# Patient Record
Sex: FEMALE | Race: WHITE | NOT HISPANIC OR LATINO
[De-identification: names, ages, dates, MRNs, and addresses within clinical notes are randomized per-mention and may not be internally consistent; named-entity substitution may affect disease eponyms.]

---

## 2018-02-16 ENCOUNTER — RESULT REVIEW (OUTPATIENT)
Age: 63
End: 2018-02-16

## 2018-07-30 ENCOUNTER — RX RENEWAL (OUTPATIENT)
Age: 63
End: 2018-07-30

## 2018-08-07 ENCOUNTER — RECORD ABSTRACTING (OUTPATIENT)
Age: 63
End: 2018-08-07

## 2018-09-24 PROBLEM — Z80.0 FAMILY HISTORY OF MALIGNANT NEOPLASM OF COLON: Status: ACTIVE | Noted: 2018-08-07

## 2018-09-24 PROBLEM — Z87.891 FORMER SMOKER: Status: ACTIVE | Noted: 2018-08-07

## 2018-09-24 PROBLEM — Z83.3 FAMILY HISTORY OF TYPE 2 DIABETES MELLITUS: Status: ACTIVE | Noted: 2018-08-07

## 2018-09-24 PROBLEM — Z86.39 HISTORY OF HYPERTHYROIDISM: Status: RESOLVED | Noted: 2018-08-07 | Resolved: 2018-09-24

## 2018-09-24 PROBLEM — Z80.3 FAMILY HISTORY OF MALIGNANT NEOPLASM OF BREAST: Status: ACTIVE | Noted: 2018-08-07

## 2018-09-24 PROBLEM — R31.9 HEMATURIA: Status: ACTIVE | Noted: 2018-08-07

## 2018-09-24 PROBLEM — Z87.01 HISTORY OF PNEUMONIA: Status: RESOLVED | Noted: 2018-08-07 | Resolved: 2018-09-24

## 2018-09-24 PROBLEM — Z82.49 FAMILY HISTORY OF HYPERTENSION: Status: ACTIVE | Noted: 2018-08-07

## 2018-09-24 PROBLEM — L65.9 HAIR LOSS: Status: ACTIVE | Noted: 2018-08-07

## 2018-09-24 RX ORDER — LOSARTAN POTASSIUM 100 MG/1
100 TABLET, FILM COATED ORAL
Refills: 0 | Status: ACTIVE | COMMUNITY

## 2018-09-24 RX ORDER — DILTIAZEM HYDROCHLORIDE 180 MG/1
180 CAPSULE, EXTENDED RELEASE ORAL
Refills: 0 | Status: ACTIVE | COMMUNITY

## 2018-09-25 ENCOUNTER — RX RENEWAL (OUTPATIENT)
Age: 63
End: 2018-09-25

## 2018-09-28 ENCOUNTER — APPOINTMENT (OUTPATIENT)
Dept: INTERNAL MEDICINE | Facility: CLINIC | Age: 63
End: 2018-09-28
Payer: COMMERCIAL

## 2018-09-28 DIAGNOSIS — R31.9 HEMATURIA, UNSPECIFIED: ICD-10-CM

## 2018-09-28 DIAGNOSIS — Z87.891 PERSONAL HISTORY OF NICOTINE DEPENDENCE: ICD-10-CM

## 2018-09-28 DIAGNOSIS — Z87.01 PERSONAL HISTORY OF PNEUMONIA (RECURRENT): ICD-10-CM

## 2018-09-28 DIAGNOSIS — Z82.49 FAMILY HISTORY OF ISCHEMIC HEART DISEASE AND OTHER DISEASES OF THE CIRCULATORY SYSTEM: ICD-10-CM

## 2018-09-28 DIAGNOSIS — Z80.0 FAMILY HISTORY OF MALIGNANT NEOPLASM OF DIGESTIVE ORGANS: ICD-10-CM

## 2018-09-28 DIAGNOSIS — L65.9 NONSCARRING HAIR LOSS, UNSPECIFIED: ICD-10-CM

## 2018-09-28 DIAGNOSIS — Z83.3 FAMILY HISTORY OF DIABETES MELLITUS: ICD-10-CM

## 2018-09-28 DIAGNOSIS — Z86.39 PERSONAL HISTORY OF OTHER ENDOCRINE, NUTRITIONAL AND METABOLIC DISEASE: ICD-10-CM

## 2018-09-28 DIAGNOSIS — Z80.3 FAMILY HISTORY OF MALIGNANT NEOPLASM OF BREAST: ICD-10-CM

## 2018-10-16 ENCOUNTER — APPOINTMENT (OUTPATIENT)
Dept: INTERNAL MEDICINE | Facility: CLINIC | Age: 63
End: 2018-10-16
Payer: COMMERCIAL

## 2018-10-16 ENCOUNTER — LABORATORY RESULT (OUTPATIENT)
Age: 63
End: 2018-10-16

## 2018-10-16 VITALS
DIASTOLIC BLOOD PRESSURE: 52 MMHG | SYSTOLIC BLOOD PRESSURE: 120 MMHG | TEMPERATURE: 97 F | BODY MASS INDEX: 57.73 KG/M2 | WEIGHT: 275 LBS | HEIGHT: 58 IN

## 2018-10-16 DIAGNOSIS — Z11.59 ENCOUNTER FOR SCREENING FOR OTHER VIRAL DISEASES: ICD-10-CM

## 2018-10-16 PROCEDURE — 36415 COLL VENOUS BLD VENIPUNCTURE: CPT

## 2018-10-16 PROCEDURE — 99214 OFFICE O/P EST MOD 30 MIN: CPT | Mod: 25

## 2018-10-16 NOTE — PLAN
[FreeTextEntry1] : She is depressed. She would benefit from seeing a therapist but she lacks the motivation to scheduled appointment. At this point will start her on Zoloft 50 mg. Risks and benefits discussed with patient. She'll followup with me in one month\par Blood pressure is stable\par She was treated for hepatitis C in the past.\par Check labs today\par Declined flu shot

## 2018-10-16 NOTE — HISTORY OF PRESENT ILLNESS
[FreeTextEntry1] : f/u BP. depression [de-identified] : She feels very depressed since her long-time partner, Myron Trevizo, . She has problems sleeping. She lacks motivation. Her cousin tries to get her out of the house. She has gained weight.She tries to talk to her son but her son and Myron a very strange and therefore he doesn't want to talk about it. She has looked up the therapists under her insurance plan but has not been motivated enough to call. She is not suicidal or homicidal. Her cardiologist gave her Xanax but she didn't like the way it made her feel.

## 2018-10-16 NOTE — HEALTH RISK ASSESSMENT
[1] : 1) Little interest or pleasure doing things for several days (1) [3] : 2) Feeling down, depressed, or hopeless for nearly every day (3) [] : No [de-identified] : former [[id number]] : 4

## 2018-10-16 NOTE — PHYSICAL EXAM
[No Acute Distress] : no acute distress [Well Nourished] : well nourished [Normal Sclera/Conjunctiva] : normal sclera/conjunctiva [Normal Outer Ear/Nose] : the outer ears and nose were normal in appearance [No Respiratory Distress] : no respiratory distress  [Clear to Auscultation] : lungs were clear to auscultation bilaterally [No Accessory Muscle Use] : no accessory muscle use [Normal Rate] : normal rate  [Regular Rhythm] : with a regular rhythm [Normal S1, S2] : normal S1 and S2 [Normal Gait] : normal gait [No Focal Deficits] : no focal deficits [Alert and Oriented x3] : oriented to person, place, and time [de-identified] : overweight [de-identified] : tearful

## 2018-10-16 NOTE — REVIEW OF SYSTEMS
[Suicidal] : suicidal [Insomnia] : insomnia [Depression] : depression [Negative] : Respiratory [Anxiety] : no anxiety

## 2018-10-18 LAB
ALBUMIN SERPL ELPH-MCNC: 4.4 G/DL
ALP BLD-CCNC: 65 U/L
ALT SERPL-CCNC: 22 U/L
ANION GAP SERPL CALC-SCNC: 17 MMOL/L
AST SERPL-CCNC: 24 U/L
BILIRUB SERPL-MCNC: 0.4 MG/DL
BUN SERPL-MCNC: 18 MG/DL
CALCIUM SERPL-MCNC: 9.8 MG/DL
CHLORIDE SERPL-SCNC: 102 MMOL/L
CHOLEST SERPL-MCNC: 167 MG/DL
CHOLEST/HDLC SERPL: 3.3 RATIO
CO2 SERPL-SCNC: 23 MMOL/L
CREAT SERPL-MCNC: 0.77 MG/DL
GLUCOSE SERPL-MCNC: 112 MG/DL
HBA1C MFR BLD HPLC: 6.2 %
HCV AB SER QL: REACTIVE
HCV S/CO RATIO: 8.41 S/CO
HDLC SERPL-MCNC: 50 MG/DL
LDLC SERPL CALC-MCNC: 95 MG/DL
POTASSIUM SERPL-SCNC: 4.2 MMOL/L
PROT SERPL-MCNC: 7.4 G/DL
SODIUM SERPL-SCNC: 142 MMOL/L
TRIGL SERPL-MCNC: 111 MG/DL

## 2018-10-30 ENCOUNTER — RX RENEWAL (OUTPATIENT)
Age: 63
End: 2018-10-30

## 2018-11-12 ENCOUNTER — APPOINTMENT (OUTPATIENT)
Dept: INTERNAL MEDICINE | Facility: CLINIC | Age: 63
End: 2018-11-12
Payer: COMMERCIAL

## 2018-11-27 ENCOUNTER — APPOINTMENT (OUTPATIENT)
Dept: INTERNAL MEDICINE | Facility: CLINIC | Age: 63
End: 2018-11-27
Payer: COMMERCIAL

## 2018-11-27 VITALS
TEMPERATURE: 99.1 F | WEIGHT: 274 LBS | BODY MASS INDEX: 57.27 KG/M2 | SYSTOLIC BLOOD PRESSURE: 136 MMHG | DIASTOLIC BLOOD PRESSURE: 70 MMHG

## 2018-11-27 DIAGNOSIS — F32.9 MAJOR DEPRESSIVE DISORDER, SINGLE EPISODE, UNSPECIFIED: ICD-10-CM

## 2018-11-27 DIAGNOSIS — M79.89 OTHER SPECIFIED SOFT TISSUE DISORDERS: ICD-10-CM

## 2018-11-27 PROCEDURE — 99214 OFFICE O/P EST MOD 30 MIN: CPT

## 2018-11-27 NOTE — HISTORY OF PRESENT ILLNESS
[FreeTextEntry8] : 6 days ago she developed chills and frequent bowel movements. Those symptoms have resolved. About 3 days ago she noticed that her left leg is red and swollen. She thinks it looks a little better today than it has in the days prior.\par \par Last visit she was prescribed Zoloft but she never took it. She is starting to feel a little better.

## 2018-11-27 NOTE — PHYSICAL EXAM
[No Acute Distress] : no acute distress [Well Nourished] : well nourished [No Respiratory Distress] : no respiratory distress  [Clear to Auscultation] : lungs were clear to auscultation bilaterally [Normal Rate] : normal rate  [Regular Rhythm] : with a regular rhythm [Normal S1, S2] : normal S1 and S2 [No Extremity Clubbing/Cyanosis] : no extremity clubbing/cyanosis [Normal Gait] : normal gait [No Focal Deficits] : no focal deficits [Normal Affect] : the affect was normal [Normal Insight/Judgement] : insight and judgment were intact [de-identified] : LE edema [de-identified] : LLE swelling [de-identified] : LLE mild erythema

## 2018-11-27 NOTE — PLAN
[FreeTextEntry1] : Will treat for cellulitis with Keflex. Given the swelling will also get a venous Doppler to rule out a DVT.\par \par She is still seeing her therapist. As she feels a little better no need for Zoloft at this time. But will reconsider if her symptoms worsen.

## 2018-11-27 NOTE — REVIEW OF SYSTEMS
[Fever] : fever [Chills] : chills [Abdominal Pain] : no abdominal pain [Nausea] : nausea [Diarrhea] : diarrhea [Vomiting] : no vomiting [Itching] : no itching [Skin Rash] : no skin rash [Suicidal] : not suicidal [Depression] : depression [Negative] : Respiratory [de-identified] : as noted in HPI

## 2018-12-04 ENCOUNTER — RX RENEWAL (OUTPATIENT)
Age: 63
End: 2018-12-04

## 2019-01-02 ENCOUNTER — APPOINTMENT (OUTPATIENT)
Dept: INTERNAL MEDICINE | Facility: CLINIC | Age: 64
End: 2019-01-02

## 2019-01-28 ENCOUNTER — RX RENEWAL (OUTPATIENT)
Age: 64
End: 2019-01-28

## 2019-02-13 ENCOUNTER — APPOINTMENT (OUTPATIENT)
Dept: INTERNAL MEDICINE | Facility: CLINIC | Age: 64
End: 2019-02-13
Payer: COMMERCIAL

## 2019-02-13 VITALS
HEIGHT: 58 IN | DIASTOLIC BLOOD PRESSURE: 80 MMHG | BODY MASS INDEX: 56.68 KG/M2 | SYSTOLIC BLOOD PRESSURE: 140 MMHG | TEMPERATURE: 98.6 F | WEIGHT: 270 LBS

## 2019-02-13 VITALS — DIASTOLIC BLOOD PRESSURE: 72 MMHG | SYSTOLIC BLOOD PRESSURE: 130 MMHG

## 2019-02-13 PROCEDURE — 99214 OFFICE O/P EST MOD 30 MIN: CPT | Mod: 25

## 2019-02-13 PROCEDURE — 36415 COLL VENOUS BLD VENIPUNCTURE: CPT

## 2019-02-13 RX ORDER — SERTRALINE HYDROCHLORIDE 50 MG/1
50 TABLET, FILM COATED ORAL
Qty: 30 | Refills: 1 | Status: DISCONTINUED | COMMUNITY
Start: 2018-10-16 | End: 2019-02-13

## 2019-02-13 RX ORDER — CEPHALEXIN 500 MG/1
500 TABLET ORAL EVERY 6 HOURS
Qty: 28 | Refills: 0 | Status: DISCONTINUED | COMMUNITY
Start: 2018-11-27 | End: 2019-02-13

## 2019-02-13 NOTE — PLAN
[FreeTextEntry1] : Depression screening negative\par Her blood pressure is okay. She does need to lose weight. She is not motivated at this time.\par Check labs today\par Followup 3 months

## 2019-02-13 NOTE — HISTORY OF PRESENT ILLNESS
[FreeTextEntry1] : f/u BP, weight. depression [de-identified] : She never took the Zoloft. She is feeling better day by day.\par She saw her cardiologist recently. Her blood pressure was okay. He wants her to do bariatric surgery.\par She denies chest pain, palpitations, shortness of breath. She does have edema. She takes Lasix but did not take it today as she was leaving home.

## 2019-02-26 LAB
ALBUMIN SERPL ELPH-MCNC: 4.6 G/DL
ALP BLD-CCNC: 70 U/L
ALT SERPL-CCNC: 24 U/L
ANION GAP SERPL CALC-SCNC: 12 MMOL/L
AST SERPL-CCNC: 20 U/L
BILIRUB SERPL-MCNC: 0.4 MG/DL
BUN SERPL-MCNC: 16 MG/DL
CALCIUM SERPL-MCNC: 9.9 MG/DL
CHLORIDE SERPL-SCNC: 102 MMOL/L
CO2 SERPL-SCNC: 26 MMOL/L
CREAT SERPL-MCNC: 0.98 MG/DL
GLUCOSE SERPL-MCNC: 115 MG/DL
HBA1C MFR BLD HPLC: 6.2 %
POTASSIUM SERPL-SCNC: 4.5 MMOL/L
PROT SERPL-MCNC: 7.6 G/DL
SODIUM SERPL-SCNC: 140 MMOL/L

## 2019-03-28 ENCOUNTER — APPOINTMENT (OUTPATIENT)
Dept: INTERNAL MEDICINE | Facility: CLINIC | Age: 64
End: 2019-03-28
Payer: COMMERCIAL

## 2019-03-28 VITALS
DIASTOLIC BLOOD PRESSURE: 60 MMHG | SYSTOLIC BLOOD PRESSURE: 100 MMHG | BODY MASS INDEX: 56.68 KG/M2 | TEMPERATURE: 98.7 F | WEIGHT: 270 LBS | HEIGHT: 58 IN

## 2019-03-28 DIAGNOSIS — G56.22 LESION OF ULNAR NERVE, LEFT UPPER LIMB: ICD-10-CM

## 2019-03-28 PROCEDURE — 99214 OFFICE O/P EST MOD 30 MIN: CPT

## 2019-03-28 NOTE — HEALTH RISK ASSESSMENT
[0] : 2) Feeling down, depressed, or hopeless: Not at all (0) [] : No [de-identified] : former [[id number]] : 0

## 2019-03-28 NOTE — PHYSICAL EXAM
[No Acute Distress] : no acute distress [Well Nourished] : well nourished [Normal Sclera/Conjunctiva] : normal sclera/conjunctiva [Normal Outer Ear/Nose] : the outer ears and nose were normal in appearance [No Respiratory Distress] : no respiratory distress  [Clear to Auscultation] : lungs were clear to auscultation bilaterally [No Accessory Muscle Use] : no accessory muscle use [Normal Rate] : normal rate  [Regular Rhythm] : with a regular rhythm [Normal S1, S2] : normal S1 and S2 [No Joint Swelling] : no joint swelling [Grossly Normal Strength/Tone] : grossly normal strength/tone [Normal Gait] : normal gait [Normal Affect] : the affect was normal [Normal Insight/Judgement] : insight and judgment were intact [de-identified] : overweight [de-identified] : no tenderness at  [de-identified] : sensation to light touch slightly impaired in left lateral arm

## 2019-03-28 NOTE — PLAN
[FreeTextEntry1] : Depression screening negative\par Likely ulnar neuropathy. However Recommended an EKG but the patient refused. She has seen her cardiologist recently and her symptoms do not feel cardiac to her\par referred to neurology for evaluation. Advised her if she has any worsening of symptoms she should also see her cardiologist. She expressed understanding

## 2019-03-28 NOTE — HISTORY OF PRESENT ILLNESS
[FreeTextEntry8] : She complains of numbness and tingling going from her left elbow into the fourth and fifth digits. She has had the symptoms for several weeks. The symptoms are constant. No relief with anti-inflammatories. No weakness. She had carpal tunnel syndrome in the past bilaterally and had surgery for it.\par She denies chest pain, palpitations, shortness of breath. She saw her cardiologist a few weeks ago.

## 2019-04-29 ENCOUNTER — RX RENEWAL (OUTPATIENT)
Age: 64
End: 2019-04-29

## 2019-05-07 ENCOUNTER — RX RENEWAL (OUTPATIENT)
Age: 64
End: 2019-05-07

## 2019-05-07 ENCOUNTER — RX CHANGE (OUTPATIENT)
Age: 64
End: 2019-05-07

## 2019-05-22 ENCOUNTER — APPOINTMENT (OUTPATIENT)
Dept: INTERNAL MEDICINE | Facility: CLINIC | Age: 64
End: 2019-05-22
Payer: COMMERCIAL

## 2019-05-22 VITALS
HEIGHT: 58 IN | SYSTOLIC BLOOD PRESSURE: 122 MMHG | BODY MASS INDEX: 57.93 KG/M2 | DIASTOLIC BLOOD PRESSURE: 80 MMHG | TEMPERATURE: 97.3 F | WEIGHT: 276 LBS

## 2019-05-22 PROCEDURE — 36415 COLL VENOUS BLD VENIPUNCTURE: CPT

## 2019-05-22 PROCEDURE — 99214 OFFICE O/P EST MOD 30 MIN: CPT | Mod: 25

## 2019-05-22 NOTE — REVIEW OF SYSTEMS
[Lower Ext Edema] : lower extremity edema [Negative] : Neurological [Chest Pain] : no chest pain [Palpitations] : no palpitations

## 2019-05-22 NOTE — HISTORY OF PRESENT ILLNESS
[FreeTextEntry1] : f/u BP, blood sugar, weight [de-identified] : She is compliant with her medications\par She is not motivated to lose weight despite knowing she needs to\par She has edema and takes lasix when she is not leaving the house\par no chest pain, palpitations, SOB

## 2019-05-22 NOTE — PLAN
[FreeTextEntry1] : BP is stable on diltiazem and losartan\par She needs to lose weight. her cardiologist recommended bariatric surgery but she is not interested. referred to a nutritionist. needs exercise also\par Check labs\par given FIT testing supplies (but she says she had a colonoscopy in 2015)\par f/u 6 months

## 2019-05-22 NOTE — PHYSICAL EXAM
[No Acute Distress] : no acute distress [Well Nourished] : well nourished [Normal Sclera/Conjunctiva] : normal sclera/conjunctiva [Normal Outer Ear/Nose] : the outer ears and nose were normal in appearance [No Respiratory Distress] : no respiratory distress  [Clear to Auscultation] : lungs were clear to auscultation bilaterally [No Accessory Muscle Use] : no accessory muscle use [Normal Rate] : normal rate  [Regular Rhythm] : with a regular rhythm [Normal S1, S2] : normal S1 and S2 [Normal Gait] : normal gait [No Focal Deficits] : no focal deficits [Normal Affect] : the affect was normal [Normal Insight/Judgement] : insight and judgment were intact [de-identified] : LE edema

## 2019-05-29 LAB
ALBUMIN SERPL ELPH-MCNC: 4.3 G/DL
ALP BLD-CCNC: 66 U/L
ALT SERPL-CCNC: 26 U/L
ANION GAP SERPL CALC-SCNC: 14 MMOL/L
AST SERPL-CCNC: 19 U/L
BILIRUB SERPL-MCNC: 0.4 MG/DL
BUN SERPL-MCNC: 18 MG/DL
CALCIUM SERPL-MCNC: 9.2 MG/DL
CHLORIDE SERPL-SCNC: 104 MMOL/L
CHOLEST SERPL-MCNC: 153 MG/DL
CHOLEST/HDLC SERPL: 3.1 RATIO
CO2 SERPL-SCNC: 25 MMOL/L
CREAT SERPL-MCNC: 0.79 MG/DL
ESTIMATED AVERAGE GLUCOSE: 131 MG/DL
GLUCOSE SERPL-MCNC: 120 MG/DL
HBA1C MFR BLD HPLC: 6.2 %
HDLC SERPL-MCNC: 49 MG/DL
LDLC SERPL CALC-MCNC: 70 MG/DL
POTASSIUM SERPL-SCNC: 4.6 MMOL/L
PROT SERPL-MCNC: 6.8 G/DL
SODIUM SERPL-SCNC: 143 MMOL/L
TRIGL SERPL-MCNC: 170 MG/DL

## 2019-07-10 ENCOUNTER — MOBILE ON CALL (OUTPATIENT)
Age: 64
End: 2019-07-10

## 2019-08-01 ENCOUNTER — RX RENEWAL (OUTPATIENT)
Age: 64
End: 2019-08-01

## 2019-10-28 ENCOUNTER — RX RENEWAL (OUTPATIENT)
Age: 64
End: 2019-10-28

## 2019-12-05 ENCOUNTER — APPOINTMENT (OUTPATIENT)
Dept: INTERNAL MEDICINE | Facility: CLINIC | Age: 64
End: 2019-12-05
Payer: MEDICARE

## 2019-12-05 VITALS
DIASTOLIC BLOOD PRESSURE: 80 MMHG | HEIGHT: 58 IN | SYSTOLIC BLOOD PRESSURE: 160 MMHG | WEIGHT: 276 LBS | TEMPERATURE: 98.2 F | BODY MASS INDEX: 57.93 KG/M2

## 2019-12-05 DIAGNOSIS — Z12.39 ENCOUNTER FOR OTHER SCREENING FOR MALIGNANT NEOPLASM OF BREAST: ICD-10-CM

## 2019-12-05 DIAGNOSIS — B37.2 CANDIDIASIS OF SKIN AND NAIL: ICD-10-CM

## 2019-12-05 PROCEDURE — 36415 COLL VENOUS BLD VENIPUNCTURE: CPT

## 2019-12-05 PROCEDURE — 99214 OFFICE O/P EST MOD 30 MIN: CPT | Mod: 25

## 2019-12-05 NOTE — PHYSICAL EXAM
[No Acute Distress] : no acute distress [Well Nourished] : well nourished [Normal Sclera/Conjunctiva] : normal sclera/conjunctiva [Normal Outer Ear/Nose] : the outer ears and nose were normal in appearance [No Respiratory Distress] : no respiratory distress  [No Accessory Muscle Use] : no accessory muscle use [Clear to Auscultation] : lungs were clear to auscultation bilaterally [Normal Rate] : normal rate  [Regular Rhythm] : with a regular rhythm [Normal S1, S2] : normal S1 and S2 [Coordination Grossly Intact] : coordination grossly intact [Normal Affect] : the affect was normal [Normal Gait] : normal gait [Normal Insight/Judgement] : insight and judgment were intact [de-identified] : oerweight [de-identified] : edema b/l

## 2019-12-05 NOTE — HISTORY OF PRESENT ILLNESS
[FreeTextEntry1] : f/u BP, blood sugar, weight [de-identified] : 63-year-old female with history of morbid obesity, hypertension and borderline diabetes is here for followup. She saw her cardiologist last month. She will have a stress test. No chest pain or  SOB. She is trying to "move more". She doesn’t drink soda.\par She c/o rash under her breasts. it is red and itchy. requesting a cream.\par

## 2019-12-05 NOTE — PLAN
[FreeTextEntry1] : BP is stable on Diltiazem and losartan. Continue current doses\par Continue Lasix\par She needs to actively work on her weight. She should consider Weight Watchers\par Clotrimazole cream for rash under breasts\par Check labs today, including CMP lipids, send results to her cardiologist\par Followup 6 months

## 2019-12-11 ENCOUNTER — RESULT REVIEW (OUTPATIENT)
Age: 64
End: 2019-12-11

## 2019-12-11 LAB
ALBUMIN SERPL ELPH-MCNC: 4.4 G/DL
ALP BLD-CCNC: 66 U/L
ALT SERPL-CCNC: 16 U/L
ANION GAP SERPL CALC-SCNC: 12 MMOL/L
AST SERPL-CCNC: 9 U/L
BILIRUB SERPL-MCNC: 0.4 MG/DL
BUN SERPL-MCNC: 17 MG/DL
CALCIUM SERPL-MCNC: 9.3 MG/DL
CHLORIDE SERPL-SCNC: 101 MMOL/L
CHOLEST SERPL-MCNC: 173 MG/DL
CHOLEST/HDLC SERPL: 3.5 RATIO
CO2 SERPL-SCNC: 25 MMOL/L
CREAT SERPL-MCNC: 0.75 MG/DL
ESTIMATED AVERAGE GLUCOSE: 134 MG/DL
GLUCOSE SERPL-MCNC: 137 MG/DL
HBA1C MFR BLD HPLC: 6.3 %
HDLC SERPL-MCNC: 50 MG/DL
LDLC SERPL CALC-MCNC: 85 MG/DL
POTASSIUM SERPL-SCNC: 4.7 MMOL/L
PROT SERPL-MCNC: 7 G/DL
SODIUM SERPL-SCNC: 138 MMOL/L
TRIGL SERPL-MCNC: 192 MG/DL

## 2020-01-21 ENCOUNTER — RX RENEWAL (OUTPATIENT)
Age: 65
End: 2020-01-21

## 2020-07-31 ENCOUNTER — APPOINTMENT (OUTPATIENT)
Dept: INTERNAL MEDICINE | Facility: CLINIC | Age: 65
End: 2020-07-31
Payer: MEDICARE

## 2020-07-31 VITALS
DIASTOLIC BLOOD PRESSURE: 70 MMHG | HEART RATE: 88 BPM | BODY MASS INDEX: 57.51 KG/M2 | SYSTOLIC BLOOD PRESSURE: 120 MMHG | OXYGEN SATURATION: 98 % | WEIGHT: 274 LBS | HEIGHT: 58 IN | TEMPERATURE: 98.1 F

## 2020-07-31 DIAGNOSIS — Z12.11 ENCOUNTER FOR SCREENING FOR MALIGNANT NEOPLASM OF COLON: ICD-10-CM

## 2020-07-31 PROCEDURE — 36415 COLL VENOUS BLD VENIPUNCTURE: CPT

## 2020-07-31 PROCEDURE — 99214 OFFICE O/P EST MOD 30 MIN: CPT | Mod: 25

## 2020-07-31 NOTE — ASSESSMENT
[FreeTextEntry1] : Depression screening negative \par Pressure is controlled on diltiazem, losartan and Lasix\par Asthma is controlled on Serevent and Asmanex.  She has albuterol as needed but does not usually have to take it\par Check CMP, lipids and hemoglobin A1c today\par She should schedule bariatric surgery evaluation\par Follow-up 6 months\par

## 2020-07-31 NOTE — PHYSICAL EXAM
[No Acute Distress] : no acute distress [Normal Sclera/Conjunctiva] : normal sclera/conjunctiva [Well Nourished] : well nourished [Normal Outer Ear/Nose] : the outer ears and nose were normal in appearance [No Accessory Muscle Use] : no accessory muscle use [No Respiratory Distress] : no respiratory distress  [Clear to Auscultation] : lungs were clear to auscultation bilaterally [Normal Rate] : normal rate  [Normal S1, S2] : normal S1 and S2 [Regular Rhythm] : with a regular rhythm [No Edema] : there was no peripheral edema [No CVA Tenderness] : no CVA  tenderness [No Spinal Tenderness] : no spinal tenderness [Coordination Grossly Intact] : coordination grossly intact [Normal Gait] : normal gait [Normal Affect] : the affect was normal [Normal Insight/Judgement] : insight and judgment were intact [de-identified] : overweight

## 2020-07-31 NOTE — HISTORY OF PRESENT ILLNESS
[FreeTextEntry1] : f/u BP, blood sugar [de-identified] : 65 y/o female with h/o morbid obesity, asthma, HTN and borderline DM who is here for f/u.\par Woke up last week with pain across her low back. still has pain in her left hip. took advil but didn’t help. now taking aleve\par Stress test 1/20\par mammo 1/20\par optho 3/20\par Saw cards in June. \par never scheduled for the bariatric surgery evaluation. eating less sweets. eating more apples and bananas. No exercise\par

## 2020-07-31 NOTE — HEALTH RISK ASSESSMENT
[MammogramDate] : 01/20 [0] : 2) Feeling down, depressed, or hopeless: Not at all (0) [[id number]] : 0 [de-identified] : former

## 2020-08-03 LAB
ALBUMIN SERPL ELPH-MCNC: 4.4 G/DL
ALP BLD-CCNC: 66 U/L
ALT SERPL-CCNC: 21 U/L
ANION GAP SERPL CALC-SCNC: 17 MMOL/L
AST SERPL-CCNC: 22 U/L
BILIRUB SERPL-MCNC: 0.4 MG/DL
BUN SERPL-MCNC: 19 MG/DL
CALCIUM SERPL-MCNC: 9.3 MG/DL
CHLORIDE SERPL-SCNC: 104 MMOL/L
CHOLEST SERPL-MCNC: 160 MG/DL
CHOLEST/HDLC SERPL: 3.4 RATIO
CO2 SERPL-SCNC: 25 MMOL/L
CREAT SERPL-MCNC: 0.86 MG/DL
ESTIMATED AVERAGE GLUCOSE: 134 MG/DL
GLUCOSE SERPL-MCNC: 132 MG/DL
HBA1C MFR BLD HPLC: 6.3 %
HDLC SERPL-MCNC: 47 MG/DL
LDLC SERPL CALC-MCNC: 86 MG/DL
POTASSIUM SERPL-SCNC: 4.7 MMOL/L
PROT SERPL-MCNC: 6.8 G/DL
SODIUM SERPL-SCNC: 145 MMOL/L
TRIGL SERPL-MCNC: 134 MG/DL

## 2021-01-19 ENCOUNTER — RX RENEWAL (OUTPATIENT)
Age: 66
End: 2021-01-19

## 2021-03-31 ENCOUNTER — APPOINTMENT (OUTPATIENT)
Dept: INTERNAL MEDICINE | Facility: CLINIC | Age: 66
End: 2021-03-31
Payer: MEDICARE

## 2021-03-31 VITALS
OXYGEN SATURATION: 96 % | HEIGHT: 58 IN | SYSTOLIC BLOOD PRESSURE: 110 MMHG | TEMPERATURE: 98.1 F | BODY MASS INDEX: 56.25 KG/M2 | WEIGHT: 268 LBS | DIASTOLIC BLOOD PRESSURE: 70 MMHG | RESPIRATION RATE: 16 BRPM | HEART RATE: 100 BPM

## 2021-03-31 DIAGNOSIS — J30.9 ALLERGIC RHINITIS, UNSPECIFIED: ICD-10-CM

## 2021-03-31 PROCEDURE — 99214 OFFICE O/P EST MOD 30 MIN: CPT | Mod: 25

## 2021-03-31 PROCEDURE — 36415 COLL VENOUS BLD VENIPUNCTURE: CPT

## 2021-03-31 RX ORDER — FLUTICASONE PROPIONATE 50 UG/1
50 SPRAY, METERED NASAL
Refills: 0 | Status: DISCONTINUED | COMMUNITY
End: 2021-03-31

## 2021-03-31 RX ORDER — FUROSEMIDE 40 MG/1
40 TABLET ORAL
Refills: 0 | Status: ACTIVE | COMMUNITY

## 2021-03-31 RX ORDER — MOMETASONE FUROATE 220 UG/1
220 INHALANT RESPIRATORY (INHALATION)
Qty: 3 | Refills: 0 | Status: DISCONTINUED | COMMUNITY
Start: 2018-07-30 | End: 2021-03-31

## 2021-03-31 RX ORDER — MELOXICAM 15 MG/1
15 TABLET ORAL
Qty: 30 | Refills: 0 | Status: COMPLETED | COMMUNITY
Start: 2021-03-10

## 2021-03-31 RX ORDER — DILTIAZEM HYDROCHLORIDE 180 MG/1
180 CAPSULE, EXTENDED RELEASE ORAL
Qty: 90 | Refills: 0 | Status: COMPLETED | COMMUNITY
Start: 2021-02-12

## 2021-03-31 NOTE — HISTORY OF PRESENT ILLNESS
[FreeTextEntry1] : Follow-up blood pressure and blood sugar [de-identified] : 65-year-old female with a history of morbid obesity, asthma, hypertension and borderline diabetes.\par She went to ortho in October with sciatica on the right. She had an MRI which had multilevel disease and spinal stenosis. She was given meloxicam and tramadol. She went to PT. \par She uses flonase for allergies\par Asmanex no longer covered. Need to change arnuity, flovent, Pulmicort or Qvar\par Saw cardiology in Jan\par Saw optho last week\par He did not schedule the bariatric surgery evaluation

## 2021-03-31 NOTE — PHYSICAL EXAM
[No Acute Distress] : no acute distress [Well Nourished] : well nourished [Normal Sclera/Conjunctiva] : normal sclera/conjunctiva [Normal Outer Ear/Nose] : the outer ears and nose were normal in appearance [No Respiratory Distress] : no respiratory distress  [No Accessory Muscle Use] : no accessory muscle use [Clear to Auscultation] : lungs were clear to auscultation bilaterally [Normal Rate] : normal rate  [Regular Rhythm] : with a regular rhythm [Normal S1, S2] : normal S1 and S2 [No Edema] : there was no peripheral edema [Coordination Grossly Intact] : coordination grossly intact [Normal Gait] : normal gait [Normal Affect] : the affect was normal [Normal Insight/Judgement] : insight and judgment were intact [de-identified] : overweight

## 2021-03-31 NOTE — PLAN
[FreeTextEntry1] : Depression screening negative \par Blood Pressure is controlled on diltiazem and  losartan. takes lasix prn onlyLasix\par Asthma is controlled on Serevent and Asmanex.  Will change asmanex to flovent. She has albuterol as needed but does not usually have to take it\par Check CMP, lipids and hemoglobin A1c today\par She should schedule bariatric surgery evaluation\par Follow-up 6 months

## 2021-04-06 ENCOUNTER — NON-APPOINTMENT (OUTPATIENT)
Age: 66
End: 2021-04-06

## 2021-04-06 LAB
ALBUMIN SERPL ELPH-MCNC: 4.4 G/DL
ALP BLD-CCNC: 73 U/L
ALT SERPL-CCNC: 19 U/L
ANION GAP SERPL CALC-SCNC: 13 MMOL/L
AST SERPL-CCNC: 16 U/L
BASOPHILS # BLD AUTO: 0.07 K/UL
BASOPHILS NFR BLD AUTO: 0.8 %
BILIRUB SERPL-MCNC: 0.5 MG/DL
BUN SERPL-MCNC: 17 MG/DL
CALCIUM SERPL-MCNC: 9.6 MG/DL
CHLORIDE SERPL-SCNC: 101 MMOL/L
CHOLEST SERPL-MCNC: 180 MG/DL
CO2 SERPL-SCNC: 25 MMOL/L
CREAT SERPL-MCNC: 0.76 MG/DL
EOSINOPHIL # BLD AUTO: 0.12 K/UL
EOSINOPHIL NFR BLD AUTO: 1.3 %
ESTIMATED AVERAGE GLUCOSE: 131 MG/DL
GLUCOSE SERPL-MCNC: 120 MG/DL
HBA1C MFR BLD HPLC: 6.2 %
HCT VFR BLD CALC: 45 %
HDLC SERPL-MCNC: 53 MG/DL
HGB BLD-MCNC: 14 G/DL
IMM GRANULOCYTES NFR BLD AUTO: 0.3 %
LDLC SERPL CALC-MCNC: 86 MG/DL
LYMPHOCYTES # BLD AUTO: 2.84 K/UL
LYMPHOCYTES NFR BLD AUTO: 31.9 %
MAN DIFF?: NORMAL
MCHC RBC-ENTMCNC: 29.5 PG
MCHC RBC-ENTMCNC: 31.1 GM/DL
MCV RBC AUTO: 94.9 FL
MONOCYTES # BLD AUTO: 0.52 K/UL
MONOCYTES NFR BLD AUTO: 5.8 %
NEUTROPHILS # BLD AUTO: 5.31 K/UL
NEUTROPHILS NFR BLD AUTO: 59.9 %
NONHDLC SERPL-MCNC: 127 MG/DL
PLATELET # BLD AUTO: 315 K/UL
POTASSIUM SERPL-SCNC: 4.3 MMOL/L
PROT SERPL-MCNC: 7 G/DL
RBC # BLD: 4.74 M/UL
RBC # FLD: 13.1 %
SODIUM SERPL-SCNC: 139 MMOL/L
TRIGL SERPL-MCNC: 206 MG/DL
WBC # FLD AUTO: 8.89 K/UL

## 2021-04-09 ENCOUNTER — NON-APPOINTMENT (OUTPATIENT)
Age: 66
End: 2021-04-09

## 2021-07-17 ENCOUNTER — TRANSCRIPTION ENCOUNTER (OUTPATIENT)
Age: 66
End: 2021-07-17

## 2021-07-17 ENCOUNTER — INPATIENT (INPATIENT)
Facility: HOSPITAL | Age: 66
LOS: 2 days | Discharge: HOME | DRG: 872 | End: 2021-07-20
Attending: INTERNAL MEDICINE | Admitting: INTERNAL MEDICINE
Payer: MEDICARE

## 2021-07-17 VITALS
SYSTOLIC BLOOD PRESSURE: 148 MMHG | OXYGEN SATURATION: 95 % | RESPIRATION RATE: 20 BRPM | TEMPERATURE: 99 F | WEIGHT: 270.07 LBS | DIASTOLIC BLOOD PRESSURE: 76 MMHG | HEART RATE: 119 BPM | HEIGHT: 58 IN

## 2021-07-17 DIAGNOSIS — Z96.651 PRESENCE OF RIGHT ARTIFICIAL KNEE JOINT: Chronic | ICD-10-CM

## 2021-07-17 LAB
ALBUMIN SERPL ELPH-MCNC: 3.5 G/DL — SIGNIFICANT CHANGE UP (ref 3.3–5)
ALP SERPL-CCNC: 65 U/L — SIGNIFICANT CHANGE UP (ref 40–120)
ALT FLD-CCNC: 26 U/L — SIGNIFICANT CHANGE UP (ref 12–78)
ANION GAP SERPL CALC-SCNC: 9 MMOL/L — SIGNIFICANT CHANGE UP (ref 5–17)
APPEARANCE UR: ABNORMAL
APTT BLD: 32.1 SEC — SIGNIFICANT CHANGE UP (ref 27.5–35.5)
AST SERPL-CCNC: 16 U/L — SIGNIFICANT CHANGE UP (ref 15–37)
BASOPHILS # BLD AUTO: 0.07 K/UL — SIGNIFICANT CHANGE UP (ref 0–0.2)
BASOPHILS NFR BLD AUTO: 0.4 % — SIGNIFICANT CHANGE UP (ref 0–2)
BILIRUB SERPL-MCNC: 0.9 MG/DL — SIGNIFICANT CHANGE UP (ref 0.2–1.2)
BILIRUB UR-MCNC: NEGATIVE — SIGNIFICANT CHANGE UP
BUN SERPL-MCNC: 14 MG/DL — SIGNIFICANT CHANGE UP (ref 7–23)
CALCIUM SERPL-MCNC: 9.1 MG/DL — SIGNIFICANT CHANGE UP (ref 8.5–10.1)
CHLORIDE SERPL-SCNC: 101 MMOL/L — SIGNIFICANT CHANGE UP (ref 96–108)
CO2 SERPL-SCNC: 28 MMOL/L — SIGNIFICANT CHANGE UP (ref 22–31)
COLOR SPEC: YELLOW — SIGNIFICANT CHANGE UP
CREAT SERPL-MCNC: 1 MG/DL — SIGNIFICANT CHANGE UP (ref 0.5–1.3)
DIFF PNL FLD: ABNORMAL
EOSINOPHIL # BLD AUTO: 0 K/UL — SIGNIFICANT CHANGE UP (ref 0–0.5)
EOSINOPHIL NFR BLD AUTO: 0 % — SIGNIFICANT CHANGE UP (ref 0–6)
GLUCOSE SERPL-MCNC: 138 MG/DL — HIGH (ref 70–99)
GLUCOSE UR QL: NEGATIVE — SIGNIFICANT CHANGE UP
HCT VFR BLD CALC: 43.5 % — SIGNIFICANT CHANGE UP (ref 34.5–45)
HGB BLD-MCNC: 13.9 G/DL — SIGNIFICANT CHANGE UP (ref 11.5–15.5)
IMM GRANULOCYTES NFR BLD AUTO: 0.4 % — SIGNIFICANT CHANGE UP (ref 0–1.5)
INR BLD: 1.23 RATIO — HIGH (ref 0.88–1.16)
KETONES UR-MCNC: NEGATIVE — SIGNIFICANT CHANGE UP
LACTATE SERPL-SCNC: 3.5 MMOL/L — HIGH (ref 0.7–2)
LEUKOCYTE ESTERASE UR-ACNC: ABNORMAL
LIDOCAIN IGE QN: 81 U/L — SIGNIFICANT CHANGE UP (ref 73–393)
LYMPHOCYTES # BLD AUTO: 1.5 K/UL — SIGNIFICANT CHANGE UP (ref 1–3.3)
LYMPHOCYTES # BLD AUTO: 9.4 % — LOW (ref 13–44)
MCHC RBC-ENTMCNC: 28.7 PG — SIGNIFICANT CHANGE UP (ref 27–34)
MCHC RBC-ENTMCNC: 32 GM/DL — SIGNIFICANT CHANGE UP (ref 32–36)
MCV RBC AUTO: 89.9 FL — SIGNIFICANT CHANGE UP (ref 80–100)
MONOCYTES # BLD AUTO: 0.63 K/UL — SIGNIFICANT CHANGE UP (ref 0–0.9)
MONOCYTES NFR BLD AUTO: 4 % — SIGNIFICANT CHANGE UP (ref 2–14)
NEUTROPHILS # BLD AUTO: 13.63 K/UL — HIGH (ref 1.8–7.4)
NEUTROPHILS NFR BLD AUTO: 85.8 % — HIGH (ref 43–77)
NITRITE UR-MCNC: NEGATIVE — SIGNIFICANT CHANGE UP
NRBC # BLD: 0 /100 WBCS — SIGNIFICANT CHANGE UP (ref 0–0)
PH UR: 6 — SIGNIFICANT CHANGE UP (ref 5–8)
PLATELET # BLD AUTO: 280 K/UL — SIGNIFICANT CHANGE UP (ref 150–400)
POTASSIUM SERPL-MCNC: 3.5 MMOL/L — SIGNIFICANT CHANGE UP (ref 3.5–5.3)
POTASSIUM SERPL-SCNC: 3.5 MMOL/L — SIGNIFICANT CHANGE UP (ref 3.5–5.3)
PROT SERPL-MCNC: 7.9 G/DL — SIGNIFICANT CHANGE UP (ref 6–8.3)
PROT UR-MCNC: NEGATIVE — SIGNIFICANT CHANGE UP
PROTHROM AB SERPL-ACNC: 14.2 SEC — HIGH (ref 10.6–13.6)
RBC # BLD: 4.84 M/UL — SIGNIFICANT CHANGE UP (ref 3.8–5.2)
RBC # FLD: 13.4 % — SIGNIFICANT CHANGE UP (ref 10.3–14.5)
SODIUM SERPL-SCNC: 138 MMOL/L — SIGNIFICANT CHANGE UP (ref 135–145)
SP GR SPEC: 1.01 — SIGNIFICANT CHANGE UP (ref 1.01–1.02)
UROBILINOGEN FLD QL: NEGATIVE — SIGNIFICANT CHANGE UP
WBC # BLD: 15.9 K/UL — HIGH (ref 3.8–10.5)
WBC # FLD AUTO: 15.9 K/UL — HIGH (ref 3.8–10.5)

## 2021-07-17 PROCEDURE — 99285 EMERGENCY DEPT VISIT HI MDM: CPT

## 2021-07-17 PROCEDURE — 71045 X-RAY EXAM CHEST 1 VIEW: CPT | Mod: 26

## 2021-07-17 RX ORDER — CEFTRIAXONE 500 MG/1
1000 INJECTION, POWDER, FOR SOLUTION INTRAMUSCULAR; INTRAVENOUS ONCE
Refills: 0 | Status: COMPLETED | OUTPATIENT
Start: 2021-07-17 | End: 2021-07-17

## 2021-07-17 RX ORDER — ACETAMINOPHEN 500 MG
650 TABLET ORAL ONCE
Refills: 0 | Status: COMPLETED | OUTPATIENT
Start: 2021-07-17 | End: 2021-07-17

## 2021-07-17 RX ORDER — SODIUM CHLORIDE 9 MG/ML
3800 INJECTION INTRAMUSCULAR; INTRAVENOUS; SUBCUTANEOUS ONCE
Refills: 0 | Status: COMPLETED | OUTPATIENT
Start: 2021-07-17 | End: 2021-07-17

## 2021-07-17 RX ADMIN — CEFTRIAXONE 1000 MILLIGRAM(S): 500 INJECTION, POWDER, FOR SOLUTION INTRAMUSCULAR; INTRAVENOUS at 23:33

## 2021-07-17 RX ADMIN — CEFTRIAXONE 100 MILLIGRAM(S): 500 INJECTION, POWDER, FOR SOLUTION INTRAMUSCULAR; INTRAVENOUS at 22:33

## 2021-07-17 RX ADMIN — SODIUM CHLORIDE 3800 MILLILITER(S): 9 INJECTION INTRAMUSCULAR; INTRAVENOUS; SUBCUTANEOUS at 23:33

## 2021-07-17 RX ADMIN — SODIUM CHLORIDE 3800 MILLILITER(S): 9 INJECTION INTRAMUSCULAR; INTRAVENOUS; SUBCUTANEOUS at 22:33

## 2021-07-17 RX ADMIN — Medication 650 MILLIGRAM(S): at 22:32

## 2021-07-17 NOTE — ED ADULT NURSE NOTE - OBJECTIVE STATEMENT
Received Pt awake and alert, c/o waking up this morning shaking with chills, fever and mid back pain, sts she has chronic back pain, was seen in urgent care and told to come to Er for further workup of fever.

## 2021-07-17 NOTE — ED PROVIDER NOTE - CLINICAL SUMMARY MEDICAL DECISION MAKING FREE TEXT BOX
sent by urgent care due to fever. pt reports she had fever TMax 103.6F today. pt reports she took Aleve at 2pm. pt reports she went to urgent care and was advised to come to ED. pt reports having mid back pain where her kidneys are. plan includes labs, sepsis work up, IVF, IV abx, UA r/o UTI, CXR r/o pneumonia, consider CT, re-assess

## 2021-07-17 NOTE — ED PROVIDER NOTE - OBJECTIVE STATEMENT
64 y/o female with PMHx HTN and Asthma sent by urgent care due to fever. pt reports she had fever TMax 103.6F today. pt reportts she took Aleve at 2pm. pt reports she went to urgent care and was advised to come to ED. pt reports having mid back pain where her kidneys are. pt denies hematuria, dysuria, vomiting, chest pain, SOB, cough, rash, or any other complaints.

## 2021-07-17 NOTE — ED ADULT NURSE NOTE - NS ED NURSE LEVEL OF CONSCIOUSNESS ORIENTATION

## 2021-07-17 NOTE — ED PROVIDER NOTE - PHYSICAL EXAMINATION

## 2021-07-17 NOTE — ED PROVIDER NOTE - ATTENDING CONTRIBUTION TO CARE
66 y/o female Hx HTN and Asthma sent by urgent care due to fever. pt reports she had fever TMax 103.6F today. pt reportts she took Aleve at 2pm. pt reports she went to urgent care and was advised to come to ED. pt reports having mid back pain where her kidneys are. pt denies hematuria, dysuria, vomiting, chest pain, SOB, cough, rash, or any other complaints. VSS heent nl neck sup heart s1s2 lungs clear abd soft NT mild CVAT ext nl neuro intact,  labs leukocytosis, elevated lactate  CT no obstructing stones Dx Pyelonephritis  IV antibiotics, admit to Dr Bryson

## 2021-07-17 NOTE — ED PROVIDER NOTE - PROGRESS NOTE DETAILS
Ct pending, Dr. Hernandez reviewed CT, advised no stone and to admit patient. Discussed with Dr. Bryson, accepting admission

## 2021-07-18 DIAGNOSIS — N12 TUBULO-INTERSTITIAL NEPHRITIS, NOT SPECIFIED AS ACUTE OR CHRONIC: ICD-10-CM

## 2021-07-18 DIAGNOSIS — R50.9 FEVER, UNSPECIFIED: ICD-10-CM

## 2021-07-18 DIAGNOSIS — I10 ESSENTIAL (PRIMARY) HYPERTENSION: ICD-10-CM

## 2021-07-18 LAB
ANION GAP SERPL CALC-SCNC: 11 MMOL/L — SIGNIFICANT CHANGE UP (ref 5–17)
BASOPHILS # BLD AUTO: 0.04 K/UL — SIGNIFICANT CHANGE UP (ref 0–0.2)
BASOPHILS NFR BLD AUTO: 0.4 % — SIGNIFICANT CHANGE UP (ref 0–2)
BUN SERPL-MCNC: 12 MG/DL — SIGNIFICANT CHANGE UP (ref 7–23)
CALCIUM SERPL-MCNC: 8 MG/DL — LOW (ref 8.5–10.1)
CHLORIDE SERPL-SCNC: 106 MMOL/L — SIGNIFICANT CHANGE UP (ref 96–108)
CO2 SERPL-SCNC: 25 MMOL/L — SIGNIFICANT CHANGE UP (ref 22–31)
CREAT SERPL-MCNC: 0.77 MG/DL — SIGNIFICANT CHANGE UP (ref 0.5–1.3)
EOSINOPHIL # BLD AUTO: 0 K/UL — SIGNIFICANT CHANGE UP (ref 0–0.5)
EOSINOPHIL NFR BLD AUTO: 0 % — SIGNIFICANT CHANGE UP (ref 0–6)
GLUCOSE SERPL-MCNC: 142 MG/DL — HIGH (ref 70–99)
HCT VFR BLD CALC: 37.6 % — SIGNIFICANT CHANGE UP (ref 34.5–45)
HGB BLD-MCNC: 12.3 G/DL — SIGNIFICANT CHANGE UP (ref 11.5–15.5)
IMM GRANULOCYTES NFR BLD AUTO: 0.4 % — SIGNIFICANT CHANGE UP (ref 0–1.5)
LACTATE SERPL-SCNC: 1.2 MMOL/L — SIGNIFICANT CHANGE UP (ref 0.7–2)
LYMPHOCYTES # BLD AUTO: 1.04 K/UL — SIGNIFICANT CHANGE UP (ref 1–3.3)
LYMPHOCYTES # BLD AUTO: 11.6 % — LOW (ref 13–44)
MAGNESIUM SERPL-MCNC: 1.6 MG/DL — SIGNIFICANT CHANGE UP (ref 1.6–2.6)
MCHC RBC-ENTMCNC: 29.5 PG — SIGNIFICANT CHANGE UP (ref 27–34)
MCHC RBC-ENTMCNC: 32.7 GM/DL — SIGNIFICANT CHANGE UP (ref 32–36)
MCV RBC AUTO: 90.2 FL — SIGNIFICANT CHANGE UP (ref 80–100)
MONOCYTES # BLD AUTO: 0.61 K/UL — SIGNIFICANT CHANGE UP (ref 0–0.9)
MONOCYTES NFR BLD AUTO: 6.8 % — SIGNIFICANT CHANGE UP (ref 2–14)
NEUTROPHILS # BLD AUTO: 7.2 K/UL — SIGNIFICANT CHANGE UP (ref 1.8–7.4)
NEUTROPHILS NFR BLD AUTO: 80.8 % — HIGH (ref 43–77)
NRBC # BLD: 0 /100 WBCS — SIGNIFICANT CHANGE UP (ref 0–0)
PLATELET # BLD AUTO: 211 K/UL — SIGNIFICANT CHANGE UP (ref 150–400)
POTASSIUM SERPL-MCNC: 3.3 MMOL/L — LOW (ref 3.5–5.3)
POTASSIUM SERPL-SCNC: 3.3 MMOL/L — LOW (ref 3.5–5.3)
PROCALCITONIN SERPL-MCNC: 0.39 NG/ML — HIGH (ref 0–0.04)
RBC # BLD: 4.17 M/UL — SIGNIFICANT CHANGE UP (ref 3.8–5.2)
RBC # FLD: 13.5 % — SIGNIFICANT CHANGE UP (ref 10.3–14.5)
SARS-COV-2 RNA SPEC QL NAA+PROBE: SIGNIFICANT CHANGE UP
SODIUM SERPL-SCNC: 142 MMOL/L — SIGNIFICANT CHANGE UP (ref 135–145)
WBC # BLD: 8.93 K/UL — SIGNIFICANT CHANGE UP (ref 3.8–10.5)
WBC # FLD AUTO: 8.93 K/UL — SIGNIFICANT CHANGE UP (ref 3.8–10.5)

## 2021-07-18 PROCEDURE — G1004: CPT

## 2021-07-18 PROCEDURE — 74177 CT ABD & PELVIS W/CONTRAST: CPT | Mod: 26,MG

## 2021-07-18 RX ORDER — LOSARTAN POTASSIUM 100 MG/1
100 TABLET, FILM COATED ORAL DAILY
Refills: 0 | Status: DISCONTINUED | OUTPATIENT
Start: 2021-07-18 | End: 2021-07-20

## 2021-07-18 RX ORDER — POTASSIUM CHLORIDE 20 MEQ
40 PACKET (EA) ORAL ONCE
Refills: 0 | Status: COMPLETED | OUTPATIENT
Start: 2021-07-18 | End: 2021-07-19

## 2021-07-18 RX ORDER — CEFAZOLIN SODIUM 1 G
2000 VIAL (EA) INJECTION EVERY 8 HOURS
Refills: 0 | Status: DISCONTINUED | OUTPATIENT
Start: 2021-07-18 | End: 2021-07-20

## 2021-07-18 RX ORDER — ALBUTEROL 90 UG/1
2 AEROSOL, METERED ORAL EVERY 6 HOURS
Refills: 0 | Status: DISCONTINUED | OUTPATIENT
Start: 2021-07-18 | End: 2021-07-20

## 2021-07-18 RX ORDER — ACETAMINOPHEN 500 MG
650 TABLET ORAL EVERY 6 HOURS
Refills: 0 | Status: DISCONTINUED | OUTPATIENT
Start: 2021-07-18 | End: 2021-07-20

## 2021-07-18 RX ORDER — ALBUTEROL 90 UG/1
2 AEROSOL, METERED ORAL
Qty: 0 | Refills: 0 | DISCHARGE

## 2021-07-18 RX ORDER — CEFTRIAXONE 500 MG/1
1000 INJECTION, POWDER, FOR SOLUTION INTRAMUSCULAR; INTRAVENOUS EVERY 24 HOURS
Refills: 0 | Status: DISCONTINUED | OUTPATIENT
Start: 2021-07-18 | End: 2021-07-18

## 2021-07-18 RX ORDER — DILTIAZEM HCL 120 MG
180 CAPSULE, EXT RELEASE 24 HR ORAL DAILY
Refills: 0 | Status: DISCONTINUED | OUTPATIENT
Start: 2021-07-18 | End: 2021-07-20

## 2021-07-18 RX ORDER — ASPIRIN/CALCIUM CARB/MAGNESIUM 324 MG
81 TABLET ORAL DAILY
Refills: 0 | Status: DISCONTINUED | OUTPATIENT
Start: 2021-07-18 | End: 2021-07-20

## 2021-07-18 RX ADMIN — Medication 650 MILLIGRAM(S): at 01:26

## 2021-07-18 RX ADMIN — Medication 100 MILLIGRAM(S): at 15:21

## 2021-07-18 RX ADMIN — Medication 100 MILLIGRAM(S): at 22:01

## 2021-07-18 NOTE — H&P ADULT - HISTORY OF PRESENT ILLNESS
66 y/o female with PMHx HTN and Asthma sent by urgent care due to fever. pt reports she had fever TMax 103.6F today. pt reportts she took Aleve at 2pm. pt reports she went to urgent care and was advised to come to ED. pt reports having mid back pain where her kidneys are. pt denies hematuria, dysuria, vomiting, chest pain, SOB, cough, rash, or any other complaints

## 2021-07-18 NOTE — H&P ADULT - PROBLEM SELECTOR PLAN 1
Admit  possibly 2/2 to RLE cellulitis  Pan-culture  IV antibiotics  ID consult  Trend CBC and lactate  Check procalcitonin  Further work-up/management pending clinical course.

## 2021-07-18 NOTE — CONSULT NOTE ADULT - SUBJECTIVE AND OBJECTIVE BOX
HPI:  66 y/o female with PMHx HTN, Asthma right TKR who presented to the hospital with cc of back pain and fever Tmax 103.6 Seen at urgent care and was advised to come to ED. + chills and rigors Denies n/v/d hematuria, dysuria, chest pain, SOB, cough. In ED WBC 15.9K. UA + Also noted to have right LE swelling redness warmth and pain.    Infectious Disease consult was called to evaluate pt and for antibiotic management.    Past Medical & Surgical Hx:  PAST MEDICAL & SURGICAL HISTORY:  Asthma  Hypertension  Status post right knee replacement    Social History--  EtOH: denies   Tobacco: denies   Drug Use: denies    FAMILY HISTORY:  Noncontributory    Allergies  amoxicillin (Other)    Intolerances  NONE      Home Medications:  Asmanex HFA:  inhaled  (2015 11:07)  diltiazem 180 mg/24 hours oral capsule, extended release:  orally  (2015 11:07)  Foradil Aerolizer:  inhaled  (2015 11:07)  losartan 50 mg oral tablet:  orally  (2015 11:07)      Current Inpatient Medications :    ANTIBIOTICS:   cefTRIAXone   IVPB 1000 milliGRAM(s) IV Intermittent every 24 hours      OTHER RELEVANT MEDICATIONS :  acetaminophen   Tablet .. 650 milliGRAM(s) Oral every 6 hours PRN      ROS:  Unable to obtain due to :     ROS:  CONSTITUTIONAL:  Negative fever or chills, feels well, good appetite  EYES:  Negative  blurry vision or double vision  CARDIOVASCULAR:  Negative for chest pain or palpitations  RESPIRATORY:  Negative for cough, wheezing, or SOB   GASTROINTESTINAL:  Negative for nausea, vomiting, diarrhea, constipation, or abdominal pain  GENITOURINARY:  Negative frequency, urgency , dysuria or hematuria   NEUROLOGIC:  No headache, confusion, dizziness, lightheadedness  All other systems were reviewed and are negative          I&O's Detail      Physical Exam:  Vital Signs Last 24 Hrs  T(C): 37.1 (2021 11:47), Max: 37.4 (2021 19:06)  T(F): 98.8 (2021 11:47), Max: 99.3 (2021 19:06)  HR: 94 (2021 11:47) (89 - 119)  BP: 124/76 (2021 11:47) (92/62 - 148/76)  RR: 16 (2021 11:47) (16 - 20)  SpO2: 95% (2021 11:47) (91% - 95%)  Height (cm): 147.3 ( @ 19:06)  Weight (kg): 122.5 ( @ 19:06)  BMI (kg/m2): 56.5 ( @ 19:06)  BSA (m2): 2.07 ( @ 19:06)    General:  no acute distress  Neck: supple, trachea midline  Lungs: clear, no wheeze/rhonchi  Cardiovascular: regular rate and rhythm, S1 S2  Abdomen: soft, nontender, ND, bowel sounds normal  Neurological:  alert and oriented x3  Skin: no rash  Extremities: right LE swelling redness warmth and pain    Labs:                         12.3   8.93  )-----------( 211      ( 2021 06:43 )             37.6   07-18    142  |  106  |  12  ----------------------------<  142<H>  3.3<L>   |  25  |  0.77    Ca    8.0<L>      2021 06:43  Mg     1.6     07-18    TPro  7.9  /  Alb  3.5  /  TBili  0.9  /  DBili  x   /  AST  16  /  ALT  26  /  AlkPhos  65  07-17    Urinalysis Basic - ( 2021 22:13 )    Color: Yellow / Appearance: Slightly Turbid / S.010 / pH: x  Gluc: x / Ketone: Negative  / Bili: Negative / Urobili: Negative   Blood: x / Protein: Negative / Nitrite: Negative   Leuk Esterase: Moderate / RBC: 6-10 /HPF / WBC 11-25   Sq Epi: x / Non Sq Epi: Few / Bacteria: Moderate      RECENT CULTURES:          RADIOLOGY & ADDITIONAL STUDIES:    EXAM:  CT ABDOMEN AND PELVIS IC                            PROCEDURE DATE:  2021          INTERPRETATION:  CLINICAL INFORMATION: Back pain and fever.    COMPARISON: None.    CONTRAST/COMPLICATIONS:  IV Contrast: Omnipaque 350  95 cc administered   5 cc discarded  Oral Contrast: NONE  Complications: None reported at time of study completion    PROCEDURE:  CT of the Abdomen and Pelvis was performed.  Sagittal and coronal reformats were performed.    FINDINGS:  LOWER CHEST: Within normal limits.    LIVER: Enlarged fatty liver.  BILE DUCTS: Normal caliber.  GALLBLADDER: Within normal limits.  SPLEEN: Within normal limits.  PANCREAS: Within normal limits.  ADRENALS: Within normal limits.  KIDNEYS/URETERS: No hydronephrosis. Too small to characterize left renal hypodensity.    BLADDER: Within normal limits.  REPRODUCTIVE ORGANS: Globular uterus. No adnexal masses.    BOWEL: No bowel obstruction. Colonic diverticulosis without evidence of acute diverticulitis. Normal appendix.  PERITONEUM: No ascites.  VESSELS: Within normal limits.  RETROPERITONEUM/LYMPH NODES: No lymphadenopathy.  ABDOMINAL WALL: A small fat containing umbilical hernia is noted.  BONES: Multilevel degenerative changes of the spine. Vacuum phenomena identified at multiple disc spaces. Advanced degenerative disc disease at L5-S1 with loss of disc space. Associated anterior and posterior osteophytic ridging with facet hypertrophy, resulting in bilateral neural foramina narrowing at this level. Canal contents as well as extent of spondylotic changes are suboptimally evaluated inherent to CT technique and best assessed with MRI provided no MR contraindications.    IMPRESSION:    No acute bowel inflammatory changes or obstruction.    Normal appendix.    Multilevel degenerative changes of the spine. Advanced degenerative disc disease at L5-S1 as detailed above. Canal contents as well as extent of spondylotic changes are suboptimally evaluated inherent to CT technique and best assessed with MRI provided no MR contraindications.    Assessment :   66 y/o female with PMHx HTN, Asthma right TKR admitted with fever leukocytosis likely sec Right LE cellulitis with lymphangitis  Rule out UTI/pyelo CT AP unremarkable  Sp fever   WBC 15.9K    Plan :   Ancef   Fu cultures  Trend temps and cbc  Elevate leg    D/w Dr PRASANNA Bryson    Continue with present regiment .  Approptiate use of antibiotics and adverse effects reviewed.      I have discussed the above plan of care with patient in detail. She expressed understanding of the treatment plan . Risks, benefits and alternatives discussed in detail. I have asked if she has any questions or concerns and appropriately addressed them to the best of my ability .      > 45 minutes spent in direct patient care reviewing  the notes, lab data/ imaging , discussion with multidisciplinary team. All questions were addressed and answered to the best of my capacity .    Thank you for allowing me to participate in the care of your patient .      Rikki Larose MD  Infectious Disease  [phone]

## 2021-07-18 NOTE — ED ADULT NURSE REASSESSMENT NOTE - NS ED NURSE REASSESS COMMENT FT1
Dr PRASANNA Bryson made aware of the K 3.3 and remote tele. DR PRASANNA Bryson stated he will take care of the K level and patient can go regular med/surg floor. Milana ordaz made aware.

## 2021-07-19 ENCOUNTER — TRANSCRIPTION ENCOUNTER (OUTPATIENT)
Age: 66
End: 2021-07-19

## 2021-07-19 LAB
ANION GAP SERPL CALC-SCNC: 10 MMOL/L — SIGNIFICANT CHANGE UP (ref 5–17)
BUN SERPL-MCNC: 9 MG/DL — SIGNIFICANT CHANGE UP (ref 7–23)
CALCIUM SERPL-MCNC: 8.2 MG/DL — LOW (ref 8.5–10.1)
CHLORIDE SERPL-SCNC: 108 MMOL/L — SIGNIFICANT CHANGE UP (ref 96–108)
CO2 SERPL-SCNC: 25 MMOL/L — SIGNIFICANT CHANGE UP (ref 22–31)
COVID-19 SPIKE DOMAIN AB INTERP: POSITIVE
COVID-19 SPIKE DOMAIN ANTIBODY RESULT: >250 U/ML — HIGH
CREAT SERPL-MCNC: 0.66 MG/DL — SIGNIFICANT CHANGE UP (ref 0.5–1.3)
CULTURE RESULTS: SIGNIFICANT CHANGE UP
GLUCOSE SERPL-MCNC: 124 MG/DL — HIGH (ref 70–99)
HCT VFR BLD CALC: 37.7 % — SIGNIFICANT CHANGE UP (ref 34.5–45)
HCV AB S/CO SERPL IA: 7.49 S/CO — HIGH (ref 0–0.99)
HCV AB SERPL-IMP: REACTIVE
HGB BLD-MCNC: 12.4 G/DL — SIGNIFICANT CHANGE UP (ref 11.5–15.5)
MAGNESIUM SERPL-MCNC: 2 MG/DL — SIGNIFICANT CHANGE UP (ref 1.6–2.6)
MCHC RBC-ENTMCNC: 29.6 PG — SIGNIFICANT CHANGE UP (ref 27–34)
MCHC RBC-ENTMCNC: 32.9 GM/DL — SIGNIFICANT CHANGE UP (ref 32–36)
MCV RBC AUTO: 90 FL — SIGNIFICANT CHANGE UP (ref 80–100)
NRBC # BLD: 0 /100 WBCS — SIGNIFICANT CHANGE UP (ref 0–0)
PLATELET # BLD AUTO: 212 K/UL — SIGNIFICANT CHANGE UP (ref 150–400)
POTASSIUM SERPL-MCNC: 3.3 MMOL/L — LOW (ref 3.5–5.3)
POTASSIUM SERPL-SCNC: 3.3 MMOL/L — LOW (ref 3.5–5.3)
RBC # BLD: 4.19 M/UL — SIGNIFICANT CHANGE UP (ref 3.8–5.2)
RBC # FLD: 13.2 % — SIGNIFICANT CHANGE UP (ref 10.3–14.5)
SARS-COV-2 IGG+IGM SERPL QL IA: >250 U/ML — HIGH
SARS-COV-2 IGG+IGM SERPL QL IA: POSITIVE
SODIUM SERPL-SCNC: 143 MMOL/L — SIGNIFICANT CHANGE UP (ref 135–145)
SPECIMEN SOURCE: SIGNIFICANT CHANGE UP
WBC # BLD: 7.35 K/UL — SIGNIFICANT CHANGE UP (ref 3.8–10.5)
WBC # FLD AUTO: 7.35 K/UL — SIGNIFICANT CHANGE UP (ref 3.8–10.5)

## 2021-07-19 RX ORDER — POTASSIUM CHLORIDE 20 MEQ
40 PACKET (EA) ORAL ONCE
Refills: 0 | Status: COMPLETED | OUTPATIENT
Start: 2021-07-19 | End: 2021-07-19

## 2021-07-19 RX ADMIN — Medication 81 MILLIGRAM(S): at 12:38

## 2021-07-19 RX ADMIN — Medication 40 MILLIEQUIVALENT(S): at 00:25

## 2021-07-19 RX ADMIN — Medication 100 MILLIGRAM(S): at 13:04

## 2021-07-19 RX ADMIN — Medication 100 MILLIGRAM(S): at 05:12

## 2021-07-19 RX ADMIN — Medication 40 MILLIEQUIVALENT(S): at 12:38

## 2021-07-19 RX ADMIN — Medication 100 MILLIGRAM(S): at 21:30

## 2021-07-19 RX ADMIN — LOSARTAN POTASSIUM 100 MILLIGRAM(S): 100 TABLET, FILM COATED ORAL at 05:12

## 2021-07-19 RX ADMIN — Medication 180 MILLIGRAM(S): at 05:12

## 2021-07-19 NOTE — DISCHARGE NOTE PROVIDER - NSDCCPCAREPLAN_GEN_ALL_CORE_FT
PRINCIPAL DISCHARGE DIAGNOSIS  Diagnosis: Cellulitis of right leg  Assessment and Plan of Treatment: Finish course of antibiotics.  Follow-up with your primary care doctor within 1 week.         PRINCIPAL DISCHARGE DIAGNOSIS  Diagnosis: Cellulitis of right leg  Assessment and Plan of Treatment: Finish course of antibiotics.  Follow-up with your primary care doctor within 1 week.        SECONDARY DISCHARGE DIAGNOSES  Diagnosis: Hepatitis C  Assessment and Plan of Treatment: Follow-up with your primary care doctor within 1 week for further work-up

## 2021-07-19 NOTE — DISCHARGE NOTE PROVIDER - HOSPITAL COURSE
66 y/o female with PMHx HTN and Asthma sent by urgent care due to fever. pt reports she had fever TMax 103.6F today. pt reportts she took Aleve at 2pm. pt reports she went to urgent care and was advised to come to ED. pt reports having mid back pain where her kidneys are. pt denies hematuria, dysuria, vomiting, chest pain, SOB, cough, rash, or any other complaints.    Patient admitted with sepsis 2/2 RLE cellulitis  Improved rapidly with iv abx  Culture data NTD    >35 minutes spent on discharge   64 y/o female with PMHx HTN and Asthma sent by urgent care due to fever. pt reports she had fever TMax 103.6F today. pt reportts she took Aleve at 2pm. pt reports she went to urgent care and was advised to come to ED. pt reports having mid back pain where her kidneys are. pt denies hematuria, dysuria, vomiting, chest pain, SOB, cough, rash, or any other complaints.    Patient admitted with sepsis 2/2 RLE cellulitis  Improved rapidly with iv abx  Culture data NTD  Hepatitis C titer + -- needs outpatient f/u    >35 minutes spent on discharge

## 2021-07-19 NOTE — DISCHARGE NOTE PROVIDER - CARE PROVIDER_API CALL
Deisy Eid  INTERNAL MEDICINE  [address] [place] [address]  Phone: [phone]  Fax: [phone]  Established Patient  Follow Up Time: 1 week

## 2021-07-19 NOTE — DISCHARGE NOTE PROVIDER - NSDCMRMEDTOKEN_GEN_ALL_CORE_FT
aspirin 81 mg oral tablet: 1 tab(s) orally once a day  diltiazem 180 mg/24 hours oral capsule, extended release: 1 cap(s) orally once a day  Flonase 50 mcg/inh nasal spray: 1 spray(s) in each nostril 2 times a day, As Needed for allergies   Flovent  mcg/inh inhalation aerosol: 1 puff(s) inhaled 2 times a day  furosemide 40 mg oral tablet: 1 tab(s) orally once a day, As Needed  losartan 100 mg oral tablet: 1 tab(s) orally once a day  One A Day Women&#x27;s Complete oral tablet: 1 tab(s) orally once a day  ProAir HFA 90 mcg/inh inhalation aerosol: 2 puff(s) inhaled 4 times a day, As Needed  Serevent Diskus 50 mcg inhalation powder: 1 puff(s) inhaled 2 times a day  Vitamin D3 2000 intl units (50 mcg) oral tablet: 1 tab(s) orally once a day   aspirin 81 mg oral tablet: 1 tab(s) orally once a day  diltiazem 180 mg/24 hours oral capsule, extended release: 1 cap(s) orally once a day  Flonase 50 mcg/inh nasal spray: 1 spray(s) in each nostril 2 times a day, As Needed for allergies   Flovent  mcg/inh inhalation aerosol: 1 puff(s) inhaled 2 times a day  furosemide 40 mg oral tablet: 1 tab(s) orally once a day, As Needed  Keflex 500 mg oral capsule: 1 cap(s) orally every 6 hours   losartan 100 mg oral tablet: 1 tab(s) orally once a day  One A Day Women&#x27;s Complete oral tablet: 1 tab(s) orally once a day  ProAir HFA 90 mcg/inh inhalation aerosol: 2 puff(s) inhaled 4 times a day, As Needed  Serevent Diskus 50 mcg inhalation powder: 1 puff(s) inhaled 2 times a day  Vitamin D3 2000 intl units (50 mcg) oral tablet: 1 tab(s) orally once a day

## 2021-07-20 ENCOUNTER — TRANSCRIPTION ENCOUNTER (OUTPATIENT)
Age: 66
End: 2021-07-20

## 2021-07-20 VITALS
TEMPERATURE: 98 F | HEART RATE: 71 BPM | SYSTOLIC BLOOD PRESSURE: 133 MMHG | OXYGEN SATURATION: 93 % | RESPIRATION RATE: 17 BRPM | DIASTOLIC BLOOD PRESSURE: 79 MMHG

## 2021-07-20 DIAGNOSIS — B19.20 UNSPECIFIED VIRAL HEPATITIS C WITHOUT HEPATIC COMA: ICD-10-CM

## 2021-07-20 PROCEDURE — 83735 ASSAY OF MAGNESIUM: CPT

## 2021-07-20 PROCEDURE — 87040 BLOOD CULTURE FOR BACTERIA: CPT

## 2021-07-20 PROCEDURE — 71045 X-RAY EXAM CHEST 1 VIEW: CPT

## 2021-07-20 PROCEDURE — 84145 PROCALCITONIN (PCT): CPT

## 2021-07-20 PROCEDURE — 87521 HEPATITIS C PROBE&RVRS TRNSC: CPT

## 2021-07-20 PROCEDURE — 99285 EMERGENCY DEPT VISIT HI MDM: CPT

## 2021-07-20 PROCEDURE — 83690 ASSAY OF LIPASE: CPT

## 2021-07-20 PROCEDURE — 85027 COMPLETE CBC AUTOMATED: CPT

## 2021-07-20 PROCEDURE — 96365 THER/PROPH/DIAG IV INF INIT: CPT

## 2021-07-20 PROCEDURE — 85025 COMPLETE CBC W/AUTO DIFF WBC: CPT

## 2021-07-20 PROCEDURE — 80053 COMPREHEN METABOLIC PANEL: CPT

## 2021-07-20 PROCEDURE — 86803 HEPATITIS C AB TEST: CPT

## 2021-07-20 PROCEDURE — 74177 CT ABD & PELVIS W/CONTRAST: CPT | Mod: MG

## 2021-07-20 PROCEDURE — 80048 BASIC METABOLIC PNL TOTAL CA: CPT

## 2021-07-20 PROCEDURE — U0003: CPT

## 2021-07-20 PROCEDURE — 93005 ELECTROCARDIOGRAM TRACING: CPT

## 2021-07-20 PROCEDURE — 87086 URINE CULTURE/COLONY COUNT: CPT

## 2021-07-20 PROCEDURE — 83605 ASSAY OF LACTIC ACID: CPT

## 2021-07-20 PROCEDURE — 81001 URINALYSIS AUTO W/SCOPE: CPT

## 2021-07-20 PROCEDURE — G1004: CPT

## 2021-07-20 PROCEDURE — 85610 PROTHROMBIN TIME: CPT

## 2021-07-20 PROCEDURE — 85730 THROMBOPLASTIN TIME PARTIAL: CPT

## 2021-07-20 PROCEDURE — 36415 COLL VENOUS BLD VENIPUNCTURE: CPT

## 2021-07-20 PROCEDURE — 86769 SARS-COV-2 COVID-19 ANTIBODY: CPT

## 2021-07-20 PROCEDURE — U0005: CPT

## 2021-07-20 RX ORDER — CEPHALEXIN 500 MG
1 CAPSULE ORAL
Qty: 28 | Refills: 0
Start: 2021-07-20 | End: 2021-07-26

## 2021-07-20 RX ADMIN — Medication 100 MILLIGRAM(S): at 06:48

## 2021-07-20 RX ADMIN — Medication 180 MILLIGRAM(S): at 06:49

## 2021-07-20 RX ADMIN — Medication 81 MILLIGRAM(S): at 11:05

## 2021-07-20 RX ADMIN — LOSARTAN POTASSIUM 100 MILLIGRAM(S): 100 TABLET, FILM COATED ORAL at 06:49

## 2021-07-20 NOTE — PROGRESS NOTE ADULT - SUBJECTIVE AND OBJECTIVE BOX
Patient is a 65y old  Female who presents with a chief complaint of fever (19 Jul 2021 14:29)      INTERVAL HPI/OVERNIGHT EVENTS: Patient seen and examined. NAD. No complaints.    Vital Signs Last 24 Hrs  T(C): 36.7 (20 Jul 2021 05:03), Max: 36.8 (19 Jul 2021 12:24)  T(F): 98.1 (20 Jul 2021 05:03), Max: 98.2 (19 Jul 2021 12:24)  HR: 80 (20 Jul 2021 06:47) (77 - 84)  BP: 112/74 (20 Jul 2021 06:47) (101/67 - 112/74)  BP(mean): --  RR: 17 (20 Jul 2021 05:03) (17 - 17)  SpO2: 95% (20 Jul 2021 05:03) (92% - 95%)    07-19    143  |  108  |  9   ----------------------------<  124<H>  3.3<L>   |  25  |  0.66    Ca    8.2<L>      19 Jul 2021 09:17  Mg     2.0     07-19                            12.4   7.35  )-----------( 212      ( 19 Jul 2021 09:17 )             37.7       CAPILLARY BLOOD GLUCOSE                  acetaminophen   Tablet .. 650 milliGRAM(s) Oral every 6 hours PRN  ALBUTerol    90 MICROgram(s) HFA Inhaler 2 Puff(s) Inhalation every 6 hours PRN  aspirin enteric coated 81 milliGRAM(s) Oral daily  ceFAZolin   IVPB 2000 milliGRAM(s) IV Intermittent every 8 hours  diltiazem    milliGRAM(s) Oral daily  losartan 100 milliGRAM(s) Oral daily              REVIEW OF SYSTEMS:  CONSTITUTIONAL: No fever, no weight loss, or no fatigue  NECK: No pain, no stiffness  RESPIRATORY: No cough, no wheezing, no chills, no hemoptysis, No shortness of breath  CARDIOVASCULAR: No chest pain, no palpitations, no dizziness, no leg swelling  GASTROINTESTINAL: No abdominal pain. No nausea, no vomiting, no hematemesis; No diarrhea, no constipation. No melena, no hematochezia.  GENITOURINARY: No dysuria, no frequency, no hematuria, no incontinence  NEUROLOGICAL: No headaches, no loss of strength, no numbness, no tremors  SKIN: No itching, no burning  MUSCULOSKELETAL: No joint pain, no swelling; No muscle, no back, no extremity pain  PSYCHIATRIC: No depression, no mood swings,   HEME/LYMPH: No easy bruising, no bleeding gums  ALLERY AND IMMUNOLOGIC: No hives       Consultant(s) Notes Reviewed:  [X] YES  [ ] NO    PHYSICAL EXAM:  GENERAL: NAD  HEAD:  Atraumatic, Normocephalic  EYES: EOMI, PERRLA, conjunctiva and sclera clear  ENMT: No tonsillar erythema, exudates, or enlargement; Moist mucous membranes  NECK: Supple, No JVD  NERVOUS SYSTEM:  Awake & alert  CHEST/LUNG: Clear to auscultation bilaterally; No rales, rhonchi, wheezing,  HEART: Regular rate and rhythm  ABDOMEN: Soft, Nontender, Nondistended; Bowel sounds present  EXTREMITIES:  No clubbing, cyanosis, or edema; improved erythema  LYMPH: No lymphadenopathy noted  SKIN: No rashes      Advanced care planning discussed with patient/family [X] YES   [ ] NO    Advanced care planning discussed with patient/family. Patient's health status was discussed. All appropriate changes have been made regarding patient's end-of-life care. Advanced care planning forms reviewed/discussed/completed.  20 minutes spent.   
Patient is a 65y old  Female who presents with a chief complaint of fever (2021 10:45)      INTERVAL HPI/OVERNIGHT EVENTS: Patient seen and examined. NAD. No complaints.    Vital Signs Last 24 Hrs  T(C): 36.9 (2021 05:08), Max: 37.2 (2021 17:59)  T(F): 98.5 (2021 05:08), Max: 98.9 (2021 17:59)  HR: 79 (2021 05:08) (79 - 97)  BP: 123/80 (2021 05:08) (113/71 - 155/70)  BP(mean): --  RR: 16 (2021 05:08) (16 - 17)  SpO2: 94% (2021 05:08) (94% - 95%)        143  |  108  |  9   ----------------------------<  124<H>  3.3<L>   |  25  |  0.66    Ca    8.2<L>      2021 09:17  Mg     2.0     -    TPro  7.9  /  Alb  3.5  /  TBili  0.9  /  DBili  x   /  AST  16  /  ALT  26  /  AlkPhos  65  -                          12.4   7.35  )-----------( 212      ( 2021 09:17 )             37.7     PT/INR - ( 2021 22:10 )   PT: 14.2 sec;   INR: 1.23 ratio         PTT - ( 2021 22:10 )  PTT:32.1 sec  CAPILLARY BLOOD GLUCOSE        Urinalysis Basic - ( 2021 22:13 )    Color: Yellow / Appearance: Slightly Turbid / S.010 / pH: x  Gluc: x / Ketone: Negative  / Bili: Negative / Urobili: Negative   Blood: x / Protein: Negative / Nitrite: Negative   Leuk Esterase: Moderate / RBC: 6-10 /HPF / WBC 11-25   Sq Epi: x / Non Sq Epi: Few / Bacteria: Moderate    ltCulture - Blood (21 @ 06:13)   Specimen Source: .Blood Blood-Peripheral   Culture Results:   No growth to date.       Historical Values  Culture - Blood (21 @ 06:13)   Specimen Source: .Blood Blood-Peripheral   Culture Results:   No growth to date.   Culture - Blood (21 @ 06:13)   Specimen Source: .Blood Blood-Peripheral   Culture Results:   No growth to date.           acetaminophen   Tablet .. 650 milliGRAM(s) Oral every 6 hours PRN  ALBUTerol    90 MICROgram(s) HFA Inhaler 2 Puff(s) Inhalation every 6 hours PRN  aspirin enteric coated 81 milliGRAM(s) Oral daily  ceFAZolin   IVPB 2000 milliGRAM(s) IV Intermittent every 8 hours  diltiazem    milliGRAM(s) Oral daily  losartan 100 milliGRAM(s) Oral daily  potassium chloride    Tablet ER 40 milliEquivalent(s) Oral once              REVIEW OF SYSTEMS:  CONSTITUTIONAL: No fever, no weight loss, or no fatigue  NECK: No pain, no stiffness  RESPIRATORY: No cough, no wheezing, no chills, no hemoptysis, No shortness of breath  CARDIOVASCULAR: No chest pain, no palpitations, no dizziness, no leg swelling  GASTROINTESTINAL: No abdominal pain. No nausea, no vomiting, no hematemesis; No diarrhea, no constipation. No melena, no hematochezia.  GENITOURINARY: No dysuria, no frequency, no hematuria, no incontinence  NEUROLOGICAL: No headaches, no loss of strength, no numbness, no tremors  SKIN: No itching, no burning  MUSCULOSKELETAL: No joint pain, no swelling; No muscle, no back, no extremity pain  PSYCHIATRIC: No depression, no mood swings,   HEME/LYMPH: No easy bruising, no bleeding gums  ALLERY AND IMMUNOLOGIC: No hives       Consultant(s) Notes Reviewed:  [X] YES  [ ] NO    PHYSICAL EXAM:  GENERAL: NAD  HEAD:  Atraumatic, Normocephalic  EYES: EOMI, PERRLA, conjunctiva and sclera clear  ENMT: No tonsillar erythema, exudates, or enlargement; Moist mucous membranes  NECK: Supple, No JVD  NERVOUS SYSTEM:  Awake & alert  CHEST/LUNG: Clear to auscultation bilaterally; No rales, rhonchi, wheezing,  HEART: Regular rate and rhythm  ABDOMEN: Soft, Nontender, Nondistended; Bowel sounds present  EXTREMITIES:  No clubbing, cyanosis, or edema; decreased RLE erythema  LYMPH: No lymphadenopathy noted  SKIN: No rashes      Advanced care planning discussed with patient/family [X] YES   [ ] NO    Advanced care planning discussed with patient/family. Patient's health status was discussed. All appropriate changes have been made regarding patient's end-of-life care. Advanced care planning forms reviewed/discussed/completed.  20 minutes spent.   
     TREVON SHORT is a 65yFemale , patient examined and chart reviewed.    INTERVAL HPI/ OVERNIGHT EVENTS:   Feeling better. Afebrile.    PAST MEDICAL & SURGICAL HISTORY:  Asthma  Hypertension  Status post right knee replacement        For details regarding the patient's social history, family history, and other miscellaneous elements, please refer the initial infectious diseases consultation and/or the admitting history and physical examination for this admission.    ROS:  Unable to obtain due to :     ROS:  CONSTITUTIONAL:  Negative fever or chills  EYES:  Negative  blurry vision or double vision  CARDIOVASCULAR:  Negative for chest pain or palpitations  RESPIRATORY:  Negative for cough, wheezing, or SOB   GASTROINTESTINAL:  Negative for nausea, vomiting, diarrhea, constipation, or abdominal pain  GENITOURINARY:  Negative frequency, urgency or dysuria  NEUROLOGIC:  No headache, confusion, dizziness, lightheadedness  All other systems were reviewed and are negative     ALLERGIES:  amoxicillin (Other)      Current inpatient medications :    ANTIBIOTICS/RELEVANT:  ceFAZolin   IVPB 2000 milliGRAM(s) IV Intermittent every 8 hours      acetaminophen   Tablet .. 650 milliGRAM(s) Oral every 6 hours PRN  ALBUTerol    90 MICROgram(s) HFA Inhaler 2 Puff(s) Inhalation every 6 hours PRN  aspirin enteric coated 81 milliGRAM(s) Oral daily  diltiazem    milliGRAM(s) Oral daily  losartan 100 milliGRAM(s) Oral daily      Objective:     @ 07:  -   @ 07:00  --------------------------------------------------------  IN: 100 mL / OUT: 0 mL / NET: 100 mL      T(C): 36.8 (21 @ 12:24), Max: 37.2 (21 @ 17:59)  HR: 84 (21 @ 12:24) (79 - 97)  BP: 109/66 (21 @ 12:24) (109/66 - 155/70)  RR: 17 (21 @ 12:24) (16 - 17)  SpO2: 92% (21 @ 12:24) (92% - 95%)  Wt(kg): --      Physical Exam:  General: well developed well nourished, in no acute distress  Neck: supple, trachea midline  Lungs: clear, no wheeze/rhonchi  Cardiovascular: regular rate and rhythm, S1 S2  Abdomen: soft, nontender,  bowel sounds normal  Neurological: alert and oriented x3  Skin: no rash  Extremities: Right LE decreasing erythema and swelling      LABS:                          12.4   7.35  )-----------( 212      ( 2021 09:17 )             37.7       07-    143  |  108  |  9   ----------------------------<  124<H>  3.3<L>   |  25  |  0.66    Ca    8.2<L>      2021 09:17  Mg     2.0         TPro  7.9  /  Alb  3.5  /  TBili  0.9  /  DBili  x   /  AST  16  /  ALT  26  /  AlkPhos  65  07-17      PT/INR - ( 2021 22:10 )   PT: 14.2 sec;   INR: 1.23 ratio         PTT - ( 2021 22:10 )  PTT:32.1 sec  Urinalysis Basic - ( 2021 22:13 )    Color: Yellow / Appearance: Slightly Turbid / S.010 / pH: x  Gluc: x / Ketone: Negative  / Bili: Negative / Urobili: Negative   Blood: x / Protein: Negative / Nitrite: Negative   Leuk Esterase: Moderate / RBC: 6-10 /HPF / WBC 11-25   Sq Epi: x / Non Sq Epi: Few / Bacteria: Moderate      MICROBIOLOGY:    Culture - Blood (collected 2021 06:13)  Source: .Blood Blood-Peripheral  Preliminary Report (2021 07:02):    No growth to date.    Culture - Blood (collected 2021 06:13)  Source: .Blood Blood-Peripheral  Preliminary Report (2021 07:02):    No growth to date.    RADIOLOGY & ADDITIONAL STUDIES:    EXAM:  CT ABDOMEN AND PELVIS IC                            PROCEDURE DATE:  2021          INTERPRETATION:  CLINICAL INFORMATION: Back pain and fever.    COMPARISON: None.    CONTRAST/COMPLICATIONS:  IV Contrast: Omnipaque 350  95 cc administered   5 cc discarded  Oral Contrast: NONE  Complications: None reported at time of study completion    PROCEDURE:  CT of the Abdomen and Pelvis was performed.  Sagittal and coronal reformats were performed.    FINDINGS:  LOWER CHEST: Within normal limits.    LIVER: Enlarged fatty liver.  BILE DUCTS: Normal caliber.  GALLBLADDER: Within normal limits.  SPLEEN: Within normal limits.  PANCREAS: Within normal limits.  ADRENALS: Within normal limits.  KIDNEYS/URETERS: No hydronephrosis. Too small to characterize left renal hypodensity.    BLADDER: Within normal limits.  REPRODUCTIVE ORGANS: Globular uterus. No adnexal masses.    BOWEL: No bowel obstruction. Colonic diverticulosis without evidence of acute diverticulitis. Normal appendix.  PERITONEUM: No ascites.  VESSELS: Within normal limits.  RETROPERITONEUM/LYMPH NODES: No lymphadenopathy.  ABDOMINAL WALL: A small fat containing umbilical hernia is noted.  BONES: Multilevel degenerative changes of the spine. Vacuum phenomena identified at multiple disc spaces. Advanced degenerative disc disease at L5-S1 with loss of disc space. Associated anterior and posterior osteophytic ridging with facet hypertrophy, resulting in bilateral neural foramina narrowing at this level. Canal contents as well as extent of spondylotic changes are suboptimally evaluated inherent to CT technique and best assessed with MRI provided no MR contraindications.    IMPRESSION:    No acute bowel inflammatory changes or obstruction.    Normal appendix.    Multilevel degenerative changes of the spine. Advanced degenerative disc disease at L5-S1 as detailed above. Canal contents as well as extent of spondylotic changes are suboptimally evaluated inherent to CT technique and best assessed with MRI provided no MR contraindications.    Assessment :   66 y/o female with PMHx HTN, Asthma right TKR admitted with fever leukocytosis likely sec Right LE cellulitis with lymphangitis. No clinical evidence for right TKR involvement.  Rule out UTI/pyelo -Doubt Also CT AP unremarkable  Sp fever   Wbc normalized  Clinically better    Plan :   Cont Ancef   Can change to po Keflex 500mg q6h x 7 days in am for dc if remains stable  Trend temps and cbc  Fu final cultures  Elevate leg    D/w Dr PRASANNA Bryson      Continue with present regiment.  Appropriate use of antibiotics and adverse effects reviewed.      I have discussed the above plan of care with patient in detail. She expressed understanding of the  treatment plan . Risks, benefits and alternatives discussed in detail. I have asked if she has any questions or concerns and appropriately addressed them to the best of my ability .    > 35 minutes were spent in direct patient care reviewing notes, medications ,labs data/ imaging , discussion with multidisciplinary team.    Thank you for allowing me to participate in care of your patient .    Rikki Larose MD  Infectious Disease  [phone]
     TREVON SHORT is a 65yFemale , patient examined and chart reviewed.    INTERVAL HPI/ OVERNIGHT EVENTS:   No events.  Afebrile.    PAST MEDICAL & SURGICAL HISTORY:  Asthma  Hypertension  Status post right knee replacement      For details regarding the patient's social history, family history, and other miscellaneous elements, please refer the initial infectious diseases consultation and/or the admitting history and physical examination for this admission.    ROS:  CONSTITUTIONAL:  Negative fever or chills  EYES:  Negative  blurry vision or double vision  CARDIOVASCULAR:  Negative for chest pain or palpitations  RESPIRATORY:  Negative for cough, wheezing, or SOB   GASTROINTESTINAL:  Negative for nausea, vomiting, diarrhea, constipation, or abdominal pain  GENITOURINARY:  Negative frequency, urgency or dysuria  NEUROLOGIC:  No headache, confusion, dizziness, lightheadedness  All other systems were reviewed and are negative     ALLERGIES:  amoxicillin (Other)      Current inpatient medications :    ANTIBIOTICS/RELEVANT:  ceFAZolin   IVPB 2000 milliGRAM(s) IV Intermittent every 8 hours    MEDICATIONS  (STANDING):  aspirin enteric coated 81 milliGRAM(s) Oral daily  diltiazem    milliGRAM(s) Oral daily  losartan 100 milliGRAM(s) Oral daily    MEDICATIONS  (PRN):  acetaminophen   Tablet .. 650 milliGRAM(s) Oral every 6 hours PRN Temp greater or equal to 38C (100.4F), Mild Pain (1 - 3)  ALBUTerol    90 MICROgram(s) HFA Inhaler 2 Puff(s) Inhalation every 6 hours PRN Shortness of Breath and/or Wheezing    Objective:  Vital Signs Last 24 Hrs  T(C): 36.9 (2021 12:27), Max: 36.9 (2021 12:27)  T(F): 98.5 (2021 12:27), Max: 98.5 (2021 12:27)  HR: 71 (2021 12:27) (71 - 80)  BP: 133/79 (2021 12:27) (101/67 - 133/79)  RR: 17 (2021 12:27) (17 - 17)  SpO2: 93% (2021 12:27) (93% - 95%)      Physical Exam:  General: no acute distress  Neck: supple, trachea midline  Lungs: clear, no wheeze/rhonchi  Cardiovascular: regular rate and rhythm, S1 S2  Abdomen: soft, nontender,  bowel sounds normal  Neurological: alert and oriented x3  Skin: no rash  Extremities: Right LE decreasing erythema and swelling      LABS:                        12.4   7.35  )-----------( 212      ( 2021 09:17 )             37.7   -    143  |  108  |  9   ----------------------------<  124<H>  3.3<L>   |  25  |  0.66    Ca    8.2<L>      2021 09:17  Mg     2.0           Urinalysis Basic - ( 2021 22:13 )    Color: Yellow / Appearance: Slightly Turbid / S.010 / pH: x  Gluc: x / Ketone: Negative  / Bili: Negative / Urobili: Negative   Blood: x / Protein: Negative / Nitrite: Negative   Leuk Esterase: Moderate / RBC: 6-10 /HPF / WBC 11-25   Sq Epi: x / Non Sq Epi: Few / Bacteria: Moderate      MICROBIOLOGY:    Culture - Urine (collected 2021 06:24)  Source: .Urine Clean Catch (Midstream)  Final Report (2021 16:47):    >=3 organisms. Probable collection contamination.    Culture - Blood (collected 2021 06:13)  Source: .Blood Blood-Peripheral  Preliminary Report (2021 07:02):    No growth to date.    Culture - Blood (collected 2021 06:13)  Source: .Blood Blood-Peripheral  Preliminary Report (2021 07:02):    No growth to date.    RADIOLOGY & ADDITIONAL STUDIES:    EXAM:  CT ABDOMEN AND PELVIS IC                            PROCEDURE DATE:  2021          INTERPRETATION:  CLINICAL INFORMATION: Back pain and fever.    COMPARISON: None.    CONTRAST/COMPLICATIONS:  IV Contrast: Omnipaque 350  95 cc administered   5 cc discarded  Oral Contrast: NONE  Complications: None reported at time of study completion    PROCEDURE:  CT of the Abdomen and Pelvis was performed.  Sagittal and coronal reformats were performed.    FINDINGS:  LOWER CHEST: Within normal limits.    LIVER: Enlarged fatty liver.  BILE DUCTS: Normal caliber.  GALLBLADDER: Within normal limits.  SPLEEN: Within normal limits.  PANCREAS: Within normal limits.  ADRENALS: Within normal limits.  KIDNEYS/URETERS: No hydronephrosis. Too small to characterize left renal hypodensity.    BLADDER: Within normal limits.  REPRODUCTIVE ORGANS: Globular uterus. No adnexal masses.    BOWEL: No bowel obstruction. Colonic diverticulosis without evidence of acute diverticulitis. Normal appendix.  PERITONEUM: No ascites.  VESSELS: Within normal limits.  RETROPERITONEUM/LYMPH NODES: No lymphadenopathy.  ABDOMINAL WALL: A small fat containing umbilical hernia is noted.  BONES: Multilevel degenerative changes of the spine. Vacuum phenomena identified at multiple disc spaces. Advanced degenerative disc disease at L5-S1 with loss of disc space. Associated anterior and posterior osteophytic ridging with facet hypertrophy, resulting in bilateral neural foramina narrowing at this level. Canal contents as well as extent of spondylotic changes are suboptimally evaluated inherent to CT technique and best assessed with MRI provided no MR contraindications.    IMPRESSION:    No acute bowel inflammatory changes or obstruction.    Normal appendix.    Multilevel degenerative changes of the spine. Advanced degenerative disc disease at L5-S1 as detailed above. Canal contents as well as extent of spondylotic changes are suboptimally evaluated inherent to CT technique and best assessed with MRI provided no MR contraindications.    Assessment :   64 y/o female with PMHx HTN, Asthma right TKR admitted with fever leukocytosis likely sec Right LE cellulitis with lymphangitis. No clinical evidence for right TKR involvement.  UTI/pyelo ruled out  Sp fever   Wbc normalized  Clinically better    Plan :   On Ancef   Can change to po Keflex 500mg q6h x 7 days for dc  Trend temps and cbc  Elevate leg  OI to dc home from ID standpoint    D/w Dr PRASANNA Bryson      Continue with present regiment.  Appropriate use of antibiotics and adverse effects reviewed.      I have discussed the above plan of care with patient in detail. She expressed understanding of the  treatment plan . Risks, benefits and alternatives discussed in detail. I have asked if she has any questions or concerns and appropriately addressed them to the best of my ability .    > 35 minutes were spent in direct patient care reviewing notes, medications ,labs data/ imaging , discussion with multidisciplinary team.    Thank you for allowing me to participate in care of your patient .    Rikki Larose MD  Infectious Disease  [phone]

## 2021-07-20 NOTE — PROGRESS NOTE ADULT - PROBLEM SELECTOR PLAN 1
possibly 2/2 to RLE cellulitis  Culture data NTD  IV antibiotics -- change to po abx when ok with ID  ID f/u
possibly 2/2 to RLE cellulitis  Culture data NTD  clinically improved -- change to po keflex and d/c home  ID f/u

## 2021-07-20 NOTE — DISCHARGE NOTE NURSING/CASE MANAGEMENT/SOCIAL WORK - PATIENT PORTAL LINK FT
You can access the FollowMyHealth Patient Portal offered by [facility] by registering at the following website: [email or web address]. By joining [employer]’s FollowMyHealth portal, you will also be able to view your health information using other applications (apps) compatible with our system.

## 2021-07-22 LAB — HCV RNA FLD QL NAA+PROBE: SIGNIFICANT CHANGE UP

## 2021-07-23 LAB
CULTURE RESULTS: SIGNIFICANT CHANGE UP
CULTURE RESULTS: SIGNIFICANT CHANGE UP
SPECIMEN SOURCE: SIGNIFICANT CHANGE UP
SPECIMEN SOURCE: SIGNIFICANT CHANGE UP

## 2021-07-29 ENCOUNTER — APPOINTMENT (OUTPATIENT)
Dept: INTERNAL MEDICINE | Facility: CLINIC | Age: 66
End: 2021-07-29
Payer: MEDICARE

## 2021-07-29 VITALS
BODY MASS INDEX: 56.68 KG/M2 | DIASTOLIC BLOOD PRESSURE: 70 MMHG | HEART RATE: 100 BPM | HEIGHT: 58 IN | WEIGHT: 270 LBS | OXYGEN SATURATION: 95 % | SYSTOLIC BLOOD PRESSURE: 140 MMHG | TEMPERATURE: 97.8 F

## 2021-07-29 DIAGNOSIS — B35.3 TINEA PEDIS: ICD-10-CM

## 2021-07-29 DIAGNOSIS — Z09 ENCOUNTER FOR FOLLOW-UP EXAMINATION AFTER COMPLETED TREATMENT FOR CONDITIONS OTHER THAN MALIGNANT NEOPLASM: ICD-10-CM

## 2021-07-29 DIAGNOSIS — Z86.19 PERSONAL HISTORY OF OTHER INFECTIOUS AND PARASITIC DISEASES: ICD-10-CM

## 2021-07-29 PROCEDURE — 99496 TRANSJ CARE MGMT HIGH F2F 7D: CPT

## 2021-07-29 NOTE — PLAN
[FreeTextEntry1] : Cellulitis has improved.  She is afebrile.\par She will continue her current medications.  She did not take Lasix today because she was coming out and therefore has edema but she will resume tomorrow.\par Hepatitis C antibody is positive but the hepatitis C RNA is negative.  This is consistent with prior testing as well.\par She requests a refill of econazole cream for athlete's foot\par Follow-up for physical

## 2021-07-29 NOTE — PHYSICAL EXAM
[No Acute Distress] : no acute distress [Well Nourished] : well nourished [Normal Sclera/Conjunctiva] : normal sclera/conjunctiva [Normal Outer Ear/Nose] : the outer ears and nose were normal in appearance [No Respiratory Distress] : no respiratory distress  [No Accessory Muscle Use] : no accessory muscle use [Clear to Auscultation] : lungs were clear to auscultation bilaterally [Normal Rate] : normal rate  [Regular Rhythm] : with a regular rhythm [Normal S1, S2] : normal S1 and S2 [Normal Affect] : the affect was normal [Normal Insight/Judgement] : insight and judgment were intact [de-identified] : overweight [de-identified] : b/l LE edema [de-identified] : RLE surgical wound, very mild redness of the anterior lower leg

## 2021-07-29 NOTE — HISTORY OF PRESENT ILLNESS
[FreeTextEntry1] : 64 y/o female presents to the office today for a hospital f/u visit, pt was admitted to [facility] for having Cellulitis in RT leg, pt was discharged on 07/20/2021 [Post-hospitalization from ___ Hospital] : Post-hospitalization from [unfilled] Hospital [Admitted on: ___] : The patient was admitted on [unfilled] [Discharged on ___] : discharged on [unfilled] [Discharge Summary] : discharge summary [Pertinent Labs] : pertinent labs [Discharge Med List] : discharge medication list [Med Reconciliation] : medication reconciliation has been completed [Patient Contacted By: ____] : and contacted by [unfilled] [FreeTextEntry2] : 65-year-old female with a history of hypertension, obesity and asthma who presented to urgent care with a fever of 103.6.  She was sent to the emergency room.  She was admitted with right lower extremity cellulitis.  She improved quickly with IV antibiotics.  She was transitioned to Keflex 500 mg every 6 hours and she was discharged home. She completed the antibiotics 3 days ago. he leg is much better. \par She was told to follow up re: hepatitis C antibody positive

## 2021-07-29 NOTE — HEALTH RISK ASSESSMENT
[No] : No [0] : 2) Feeling down, depressed, or hopeless: Not at all (0) [PHQ-2 Negative - No further assessment needed] : PHQ-2 Negative - No further assessment needed [] : No [[id number]] : 0

## 2021-07-29 NOTE — PHYSICAL EXAM
[No Acute Distress] : no acute distress [Well Nourished] : well nourished [Normal Sclera/Conjunctiva] : normal sclera/conjunctiva [Normal Outer Ear/Nose] : the outer ears and nose were normal in appearance [No Respiratory Distress] : no respiratory distress  [No Accessory Muscle Use] : no accessory muscle use [Clear to Auscultation] : lungs were clear to auscultation bilaterally [Normal Rate] : normal rate  [Regular Rhythm] : with a regular rhythm [Normal S1, S2] : normal S1 and S2 [Normal Affect] : the affect was normal [Normal Insight/Judgement] : insight and judgment were intact [de-identified] : overweight [de-identified] : b/l LE edema [de-identified] : RLE surgical wound, very mild redness of the anterior lower leg

## 2021-07-29 NOTE — HEALTH RISK ASSESSMENT
[No] : No [0] : 2) Feeling down, depressed, or hopeless: Not at all (0) [PHQ-2 Negative - No further assessment needed] : PHQ-2 Negative - No further assessment needed [] : No [[id number]] : 0

## 2021-07-29 NOTE — HISTORY OF PRESENT ILLNESS
[FreeTextEntry1] : 64 y/o female presents to the office today for a hospital f/u visit, pt was admitted to [facility] for having Cellulitis in RT leg, pt was discharged on 07/20/2021 [Post-hospitalization from ___ Hospital] : Post-hospitalization from [unfilled] Hospital [Admitted on: ___] : The patient was admitted on [unfilled] [Discharged on ___] : discharged on [unfilled] [Discharge Summary] : discharge summary [Pertinent Labs] : pertinent labs [Discharge Med List] : discharge medication list [Med Reconciliation] : medication reconciliation has been completed [Patient Contacted By: ____] : and contacted by [unfilled] [FreeTextEntry2] : 65-year-old female with a history of hypertension, obesity and asthma who presented to urgent care with a fever of 103.6.  She was sent to the emergency room.  She was admitted with right lower extremity cellulitis.  She improved quickly with IV antibiotics.  She was transitioned to Keflex 500 mg every 6 hours and she was discharged home. She completed the antibiotics 3 days ago. he leg is much better. \par She was told to follow up re: hepatitis C antibody positive

## 2021-10-26 ENCOUNTER — APPOINTMENT (OUTPATIENT)
Dept: INTERNAL MEDICINE | Facility: CLINIC | Age: 66
End: 2021-10-26
Payer: MEDICARE

## 2021-10-26 VITALS
TEMPERATURE: 97.8 F | BODY MASS INDEX: 56.88 KG/M2 | DIASTOLIC BLOOD PRESSURE: 70 MMHG | WEIGHT: 271 LBS | SYSTOLIC BLOOD PRESSURE: 120 MMHG | HEIGHT: 58 IN

## 2021-10-26 DIAGNOSIS — J45.909 UNSPECIFIED ASTHMA, UNCOMPLICATED: ICD-10-CM

## 2021-10-26 PROCEDURE — 99214 OFFICE O/P EST MOD 30 MIN: CPT | Mod: 25

## 2021-10-26 PROCEDURE — 36415 COLL VENOUS BLD VENIPUNCTURE: CPT

## 2021-10-26 NOTE — PLAN
[FreeTextEntry1] : Depression screening negative \par Blood Pressure is controlled on diltiazem, losartan and Lasix\par She does not feel that her asthma is well controlled on Serevent and FLovent. Will change flovent to Pulmicort\par Check CMP, lipids and hemoglobin A1c today\par She should schedule bariatric surgery evaluation\par She declined the pneumonia vaccine and the flu shot\par Follow-up 6 months\par

## 2021-10-26 NOTE — HISTORY OF PRESENT ILLNESS
[FreeTextEntry1] : f/u BP, blood sugar, asthma [de-identified] : 65-year-old female with a history of morbid obesity, asthma, hypertension and borderline diabetes.\par Had to change from Asmanex due to insurance. Changed to Flovent but she doesn’t feel as if she can take a deep enough breath. The insurance covers arnuity, flovent, pulmicort and Qvar\par She had the Moderna COVID vaccine. She wonders about the booster.\par She uses flonase for allergies\par She gets occasional pain in her right ear. no change in hearing or tinitus\par Saw cardiology in Jan. Goes back in November\par Saw optho last week

## 2021-10-26 NOTE — PHYSICAL EXAM
[No Acute Distress] : no acute distress [Well Nourished] : well nourished [Normal Sclera/Conjunctiva] : normal sclera/conjunctiva [Normal Outer Ear/Nose] : the outer ears and nose were normal in appearance [No Respiratory Distress] : no respiratory distress  [No Accessory Muscle Use] : no accessory muscle use [Clear to Auscultation] : lungs were clear to auscultation bilaterally [Normal Rate] : normal rate  [Regular Rhythm] : with a regular rhythm [Normal S1, S2] : normal S1 and S2 [No Edema] : there was no peripheral edema [Coordination Grossly Intact] : coordination grossly intact [Normal Gait] : normal gait [Normal Affect] : the affect was normal [Normal Insight/Judgement] : insight and judgment were intact [de-identified] : overweight

## 2021-10-26 NOTE — HEALTH RISK ASSESSMENT
[0] : 2) Feeling down, depressed, or hopeless: Not at all (0) [PHQ-2 Negative - No further assessment needed] : PHQ-2 Negative - No further assessment needed [] : No [[id number]] : 0

## 2021-10-31 LAB
ALBUMIN SERPL ELPH-MCNC: 4.3 G/DL
ALP BLD-CCNC: 62 U/L
ALT SERPL-CCNC: 21 U/L
ANION GAP SERPL CALC-SCNC: 17 MMOL/L
AST SERPL-CCNC: 21 U/L
BILIRUB SERPL-MCNC: 0.5 MG/DL
BUN SERPL-MCNC: 13 MG/DL
CALCIUM SERPL-MCNC: 9.4 MG/DL
CHLORIDE SERPL-SCNC: 102 MMOL/L
CHOLEST SERPL-MCNC: 170 MG/DL
CO2 SERPL-SCNC: 22 MMOL/L
CREAT SERPL-MCNC: 0.81 MG/DL
ESTIMATED AVERAGE GLUCOSE: 134 MG/DL
GLUCOSE SERPL-MCNC: 129 MG/DL
HBA1C MFR BLD HPLC: 6.3 %
HDLC SERPL-MCNC: 46 MG/DL
LDLC SERPL CALC-MCNC: 90 MG/DL
NONHDLC SERPL-MCNC: 123 MG/DL
POTASSIUM SERPL-SCNC: 4.2 MMOL/L
PROT SERPL-MCNC: 6.8 G/DL
SODIUM SERPL-SCNC: 140 MMOL/L
TRIGL SERPL-MCNC: 166 MG/DL

## 2021-11-01 ENCOUNTER — RX RENEWAL (OUTPATIENT)
Age: 66
End: 2021-11-01

## 2021-11-03 ENCOUNTER — NON-APPOINTMENT (OUTPATIENT)
Age: 66
End: 2021-11-03

## 2022-01-04 ENCOUNTER — APPOINTMENT (OUTPATIENT)
Dept: INTERNAL MEDICINE | Facility: CLINIC | Age: 67
End: 2022-01-04
Payer: MEDICARE

## 2022-01-04 VITALS
OXYGEN SATURATION: 93 % | HEIGHT: 58 IN | DIASTOLIC BLOOD PRESSURE: 70 MMHG | SYSTOLIC BLOOD PRESSURE: 140 MMHG | WEIGHT: 271 LBS | HEART RATE: 102 BPM | BODY MASS INDEX: 56.88 KG/M2 | TEMPERATURE: 97.1 F

## 2022-01-04 VITALS — OXYGEN SATURATION: 95 %

## 2022-01-04 DIAGNOSIS — J01.90 ACUTE SINUSITIS, UNSPECIFIED: ICD-10-CM

## 2022-01-04 PROCEDURE — 99214 OFFICE O/P EST MOD 30 MIN: CPT

## 2022-01-04 NOTE — HISTORY OF PRESENT ILLNESS
[FreeTextEntry8] : She c/o 4 days of nonproductive cough, sinus pressure and ear pressure. Her ears feel clogged\par T max 100.6 the first day\par taking mucinex and flonase. last took tylenol last night\par

## 2022-01-04 NOTE — REVIEW OF SYSTEMS
[Shortness Of Breath] : shortness of breath [Cough] : cough [Negative] : Neurological [FreeTextEntry4] : as noted in HPI

## 2022-01-04 NOTE — PHYSICAL EXAM
[No Acute Distress] : no acute distress [Normal TMs] : both tympanic membranes were normal [No Lymphadenopathy] : no lymphadenopathy [Supple] : supple [No Respiratory Distress] : no respiratory distress  [No Accessory Muscle Use] : no accessory muscle use [Clear to Auscultation] : lungs were clear to auscultation bilaterally [Normal Rate] : normal rate  [Regular Rhythm] : with a regular rhythm [Normal S1, S2] : normal S1 and S2 [Normal Affect] : the affect was normal [Normal Insight/Judgement] : insight and judgment were intact

## 2022-01-04 NOTE — PLAN
[FreeTextEntry1] : will send a RVP for COVID and flu. She understands it could take up to 72 hours for results to be available\par will treat with clarithromycin. D/C if RVP is positive

## 2022-01-05 LAB
RAPID RVP RESULT: DETECTED
SARS-COV-2 RNA PNL RESP NAA+PROBE: DETECTED

## 2022-01-05 RX ORDER — CLARITHROMYCIN 500 MG/1
500 TABLET, FILM COATED ORAL
Qty: 20 | Refills: 0 | Status: DISCONTINUED | COMMUNITY
Start: 2022-01-04 | End: 2022-01-05

## 2022-01-05 RX ORDER — LEVOFLOXACIN 500 MG/1
500 TABLET, FILM COATED ORAL DAILY
Qty: 7 | Refills: 0 | Status: DISCONTINUED | COMMUNITY
Start: 2022-01-05 | End: 2022-01-05

## 2022-01-07 ENCOUNTER — NON-APPOINTMENT (OUTPATIENT)
Age: 67
End: 2022-01-07

## 2022-01-12 ENCOUNTER — APPOINTMENT (OUTPATIENT)
Dept: INTERNAL MEDICINE | Facility: CLINIC | Age: 67
End: 2022-01-12

## 2022-01-24 ENCOUNTER — RX RENEWAL (OUTPATIENT)
Age: 67
End: 2022-01-24

## 2022-03-16 ENCOUNTER — APPOINTMENT (OUTPATIENT)
Dept: INTERNAL MEDICINE | Facility: CLINIC | Age: 67
End: 2022-03-16
Payer: MEDICARE

## 2022-03-16 VITALS
SYSTOLIC BLOOD PRESSURE: 130 MMHG | HEART RATE: 102 BPM | WEIGHT: 266 LBS | OXYGEN SATURATION: 96 % | HEIGHT: 58 IN | BODY MASS INDEX: 55.84 KG/M2 | DIASTOLIC BLOOD PRESSURE: 60 MMHG

## 2022-03-16 VITALS — TEMPERATURE: 98 F

## 2022-03-16 DIAGNOSIS — I87.2 VENOUS INSUFFICIENCY (CHRONIC) (PERIPHERAL): ICD-10-CM

## 2022-03-16 DIAGNOSIS — L30.9 DERMATITIS, UNSPECIFIED: ICD-10-CM

## 2022-03-16 DIAGNOSIS — R60.9 EDEMA, UNSPECIFIED: ICD-10-CM

## 2022-03-16 PROCEDURE — 99213 OFFICE O/P EST LOW 20 MIN: CPT

## 2022-03-16 NOTE — HISTORY OF PRESENT ILLNESS
[FreeTextEntry1] : patient here for possible cellulitis in both legs [FreeTextEntry8] : 65 y/o female with h/o HTN who c/o "cellulitis" for about 1 month. She has noticed mild redness of her lower legs b/l. no fever/chills. She denies any drainage. She has noted any warmth. She had cellulitis in JUly 2021.\par Also noted a "brown spot" on her left lower leg about 2 months ago.

## 2022-03-16 NOTE — HEALTH RISK ASSESSMENT
[Former] : Former [0] : 2) Feeling down, depressed, or hopeless: Not at all (0) [PHQ-2 Negative - No further assessment needed] : PHQ-2 Negative - No further assessment needed [[id number]] : 0

## 2022-03-16 NOTE — REVIEW OF SYSTEMS
[Lower Ext Edema] : lower extremity edema [Negative] : Respiratory [Chest Pain] : no chest pain [Palpitations] : no palpitations [Orthopnea] : no orthopnea [de-identified] : as noted in HPI

## 2022-03-16 NOTE — REVIEW OF SYSTEMS
[Lower Ext Edema] : lower extremity edema [Negative] : Respiratory [Chest Pain] : no chest pain [Palpitations] : no palpitations [Orthopnea] : no orthopnea [de-identified] : as noted in HPI

## 2022-03-16 NOTE — PHYSICAL EXAM
[No Acute Distress] : no acute distress [Well Nourished] : well nourished [Normal Sclera/Conjunctiva] : normal sclera/conjunctiva [Normal Outer Ear/Nose] : the outer ears and nose were normal in appearance [No Respiratory Distress] : no respiratory distress  [No Accessory Muscle Use] : no accessory muscle use [Clear to Auscultation] : lungs were clear to auscultation bilaterally [Normal Rate] : normal rate  [Regular Rhythm] : with a regular rhythm [Normal S1, S2] : normal S1 and S2 [Coordination Grossly Intact] : coordination grossly intact [Normal Gait] : normal gait [de-identified] : overweight [de-identified] : b/l LE edema [de-identified] : healed excoriations on RLE, mild erythema of b/l LE. no warmth or drainage. LLE annular hyperpigmented lesion

## 2022-03-16 NOTE — PHYSICAL EXAM
[No Acute Distress] : no acute distress [Well Nourished] : well nourished [Normal Sclera/Conjunctiva] : normal sclera/conjunctiva [Normal Outer Ear/Nose] : the outer ears and nose were normal in appearance [No Respiratory Distress] : no respiratory distress  [No Accessory Muscle Use] : no accessory muscle use [Clear to Auscultation] : lungs were clear to auscultation bilaterally [Normal Rate] : normal rate  [Regular Rhythm] : with a regular rhythm [Normal S1, S2] : normal S1 and S2 [Coordination Grossly Intact] : coordination grossly intact [Normal Gait] : normal gait [de-identified] : overweight [de-identified] : b/l LE edema [de-identified] : healed excoriations on RLE, mild erythema of b/l LE. no warmth or drainage. LLE annular hyperpigmented lesion

## 2022-03-16 NOTE — PLAN
[FreeTextEntry1] : Depression screening negative \par Doubt cellulitis. More likely chronic venous stasis changes. She should continue lasix. Start compression stockings, leg elevation\par She should consider bariatric surgery. She is reluctant. Advised to schedule a consultation to discuss and ask all of her questions. \par dermatology referral for LLE lesion\par

## 2022-03-16 NOTE — HEALTH RISK ASSESSMENT
[Former] : Former [0] : 2) Feeling down, depressed, or hopeless: Not at all (0) [PHQ-2 Negative - No further assessment needed] : PHQ-2 Negative - No further assessment needed [[id number]] : 0

## 2022-04-12 ENCOUNTER — RX RENEWAL (OUTPATIENT)
Age: 67
End: 2022-04-12

## 2022-04-27 ENCOUNTER — APPOINTMENT (OUTPATIENT)
Dept: INTERNAL MEDICINE | Facility: CLINIC | Age: 67
End: 2022-04-27

## 2022-05-24 ENCOUNTER — APPOINTMENT (OUTPATIENT)
Dept: INTERNAL MEDICINE | Facility: CLINIC | Age: 67
End: 2022-05-24
Payer: MEDICARE

## 2022-05-24 VITALS
DIASTOLIC BLOOD PRESSURE: 70 MMHG | BODY MASS INDEX: 56.68 KG/M2 | SYSTOLIC BLOOD PRESSURE: 124 MMHG | HEIGHT: 58 IN | WEIGHT: 270 LBS

## 2022-05-24 PROCEDURE — 99214 OFFICE O/P EST MOD 30 MIN: CPT | Mod: 25

## 2022-05-24 PROCEDURE — 36415 COLL VENOUS BLD VENIPUNCTURE: CPT

## 2022-05-24 RX ORDER — BUDESONIDE 180 UG/1
180 AEROSOL, POWDER RESPIRATORY (INHALATION)
Qty: 3 | Refills: 1 | Status: DISCONTINUED | COMMUNITY
Start: 2021-10-26 | End: 2022-05-24

## 2022-05-24 NOTE — PLAN
[FreeTextEntry1] : Depression screening negative \par Blood Pressure is controlled on diltiazem, losartan and Lasix\par Continue Serevent and FLovent.\par Check CMP, lipids and hemoglobin A1c today\par WIll give keflex for LLE however it is unclear if it is cellulitis. will have her return in 1 week to follow up. She would probably benefit from increased lasix and compression stocking. Also needs weight loss.\par She should schedule bariatric surgery evaluation\par She declines the pneumonia vaccine \par She will return for a Moderna COVID booster\par \par

## 2022-05-24 NOTE — HISTORY OF PRESENT ILLNESS
[FreeTextEntry1] : f/u BP, blood sugar, asthma, possible cellulitis [de-identified] : 66-year-old female with a history of morbid obesity, asthma, hypertension and borderline diabetes.\par She went to dermatology for venous stasis changes on her legs. She was given a clobetasol but developed blisters. She was then given mupirocin topical. She didn’t note any improvement. She is to follow up with dermatology tomorrow. \par She requests a refill of econazole cream\par Had to change from Asmanex due to insurance. She was on Sevevent and FLovent. She was changed to pulmicort Last visit but she didn’t think it worked better and went back to the flovent..\par She had the Moderna COVID vaccine. She wonders about the booster.\par She uses flonase for allergies\par \par

## 2022-05-24 NOTE — HEALTH RISK ASSESSMENT
[0] : 2) Feeling down, depressed, or hopeless: Not at all (0) [PHQ-2 Negative - No further assessment needed] : PHQ-2 Negative - No further assessment needed [Former] : Former [[id number]] : 0

## 2022-05-24 NOTE — PHYSICAL EXAM
[No Acute Distress] : no acute distress [Well Nourished] : well nourished [Normal Sclera/Conjunctiva] : normal sclera/conjunctiva [Normal Outer Ear/Nose] : the outer ears and nose were normal in appearance [No Respiratory Distress] : no respiratory distress  [No Accessory Muscle Use] : no accessory muscle use [Clear to Auscultation] : lungs were clear to auscultation bilaterally [Normal Rate] : normal rate  [Regular Rhythm] : with a regular rhythm [Normal S1, S2] : normal S1 and S2 [Coordination Grossly Intact] : coordination grossly intact [Normal Gait] : normal gait [Normal Affect] : the affect was normal [Normal Insight/Judgement] : insight and judgment were intact [de-identified] : overweight [de-identified] : b/l LE edema [de-identified] : LLE 3 cm erythematous lesion

## 2022-06-01 ENCOUNTER — APPOINTMENT (OUTPATIENT)
Dept: INTERNAL MEDICINE | Facility: CLINIC | Age: 67
End: 2022-06-01
Payer: MEDICARE

## 2022-06-01 VITALS
DIASTOLIC BLOOD PRESSURE: 76 MMHG | HEART RATE: 101 BPM | TEMPERATURE: 97.8 F | OXYGEN SATURATION: 94 % | HEIGHT: 58 IN | BODY MASS INDEX: 56.68 KG/M2 | WEIGHT: 270 LBS | SYSTOLIC BLOOD PRESSURE: 120 MMHG

## 2022-06-01 DIAGNOSIS — Z23 ENCOUNTER FOR IMMUNIZATION: ICD-10-CM

## 2022-06-01 DIAGNOSIS — L03.116 CELLULITIS OF LEFT LOWER LIMB: ICD-10-CM

## 2022-06-01 LAB
ALBUMIN SERPL ELPH-MCNC: 4.6 G/DL
ALP BLD-CCNC: 69 U/L
ALT SERPL-CCNC: 26 U/L
ANION GAP SERPL CALC-SCNC: 14 MMOL/L
AST SERPL-CCNC: 25 U/L
BILIRUB SERPL-MCNC: 0.6 MG/DL
BUN SERPL-MCNC: 16 MG/DL
CALCIUM SERPL-MCNC: 9.9 MG/DL
CHLORIDE SERPL-SCNC: 100 MMOL/L
CHOLEST SERPL-MCNC: 173 MG/DL
CO2 SERPL-SCNC: 26 MMOL/L
CREAT SERPL-MCNC: 0.79 MG/DL
EGFR: 82 ML/MIN/1.73M2
ESTIMATED AVERAGE GLUCOSE: 140 MG/DL
GLUCOSE SERPL-MCNC: 138 MG/DL
HBA1C MFR BLD HPLC: 6.5 %
HDLC SERPL-MCNC: 49 MG/DL
LDLC SERPL CALC-MCNC: 94 MG/DL
NONHDLC SERPL-MCNC: 124 MG/DL
POTASSIUM SERPL-SCNC: 3.9 MMOL/L
PROT SERPL-MCNC: 6.9 G/DL
SODIUM SERPL-SCNC: 140 MMOL/L
TRIGL SERPL-MCNC: 149 MG/DL

## 2022-06-01 PROCEDURE — 99214 OFFICE O/P EST MOD 30 MIN: CPT | Mod: 25

## 2022-06-01 PROCEDURE — 0064A: CPT

## 2022-06-01 RX ORDER — MUPIROCIN 20 MG/G
2 OINTMENT TOPICAL
Qty: 22 | Refills: 0 | Status: COMPLETED | COMMUNITY
Start: 2022-05-17

## 2022-06-01 RX ORDER — CEPHALEXIN 250 MG/1
250 TABLET ORAL EVERY 6 HOURS
Qty: 28 | Refills: 0 | Status: DISCONTINUED | COMMUNITY
Start: 2022-05-24 | End: 2022-06-01

## 2022-06-01 RX ORDER — CEPHALEXIN 250 MG/1
250 CAPSULE ORAL
Qty: 28 | Refills: 0 | Status: COMPLETED | COMMUNITY
Start: 2022-05-24

## 2022-06-01 RX ORDER — CLOBETASOL PROPIONATE 0.5 MG/G
0.05 CREAM TOPICAL
Qty: 15 | Refills: 0 | Status: COMPLETED | COMMUNITY
Start: 2022-05-03

## 2022-06-01 NOTE — HEALTH RISK ASSESSMENT
[Former] : Former [0] : 2) Feeling down, depressed, or hopeless: Not at all (0) [PHQ-2 Negative - No further assessment needed] : PHQ-2 Negative - No further assessment needed [[id number]] : 0

## 2022-06-01 NOTE — PLAN
[FreeTextEntry1] : lab reviewed with patient. HbA1c is 6.5. She should start metformin. Risks/benefits discussed with patient. Will start 500 mg with dinner. She is on losartan. LDL <100\par Blood Pressure is controlled on diltiazem, losartan and Lasix\par Discussed the need to lose weight and encouraged bariatric surgery consulation\par she has chronc venous stasis. She wear compression stockings. Also referred to vascular surgery.\par Pt requests booster of Moderna Covid vaccine. Allergies reviewed, patient denies allergy to previous Moderna vaccine. Pt was educated on vaccine and consent was obtained. Vaccine was administered into right deltoid. Pt tolerated injection well. Pt was observed for 15 minutes, no immediate reactions noted.\par  \par \par

## 2022-06-01 NOTE — HISTORY OF PRESENT ILLNESS
[FreeTextEntry1] : f/u BP, blood sugar, asthma, possible cellulitis [de-identified] : 66-year-old female with a history of morbid obesity, asthma, hypertension and borderline diabetes.\par Last visit she was started on keflex for possible LLE cellulitis. She had seen dermatology prior and was  given clobetasol but developed blisters. She was then given mupirocin topical. She didn’t note any improvement. Given Keflex one week ago. she completed the course. She didn’t note any improvement. Still has a reddish lesion on her LLE.\par She would like a Moderna COVID booster today\par \par

## 2022-06-01 NOTE — PHYSICAL EXAM
[No Acute Distress] : no acute distress [Well Nourished] : well nourished [Normal Sclera/Conjunctiva] : normal sclera/conjunctiva [Normal Outer Ear/Nose] : the outer ears and nose were normal in appearance [No Respiratory Distress] : no respiratory distress  [No Accessory Muscle Use] : no accessory muscle use [Clear to Auscultation] : lungs were clear to auscultation bilaterally [Normal Rate] : normal rate  [Regular Rhythm] : with a regular rhythm [Normal S1, S2] : normal S1 and S2 [Coordination Grossly Intact] : coordination grossly intact [Normal Gait] : normal gait [Normal Affect] : the affect was normal [Normal Insight/Judgement] : insight and judgment were intact [de-identified] : overweight [de-identified] : b/l LE edema [de-identified] : LLE 3 cm erythematous lesion

## 2022-07-10 ENCOUNTER — RX RENEWAL (OUTPATIENT)
Age: 67
End: 2022-07-10

## 2022-07-29 ENCOUNTER — RX RENEWAL (OUTPATIENT)
Age: 67
End: 2022-07-29

## 2022-09-01 ENCOUNTER — APPOINTMENT (OUTPATIENT)
Dept: INTERNAL MEDICINE | Facility: CLINIC | Age: 67
End: 2022-09-01

## 2022-09-08 ENCOUNTER — APPOINTMENT (OUTPATIENT)
Dept: INTERNAL MEDICINE | Facility: CLINIC | Age: 67
End: 2022-09-08

## 2022-09-08 VITALS
DIASTOLIC BLOOD PRESSURE: 78 MMHG | HEIGHT: 58 IN | SYSTOLIC BLOOD PRESSURE: 130 MMHG | HEART RATE: 74 BPM | WEIGHT: 264 LBS | OXYGEN SATURATION: 98 % | RESPIRATION RATE: 17 BRPM | BODY MASS INDEX: 55.42 KG/M2 | TEMPERATURE: 98.1 F

## 2022-09-08 PROCEDURE — 99214 OFFICE O/P EST MOD 30 MIN: CPT | Mod: 25

## 2022-09-08 PROCEDURE — 36415 COLL VENOUS BLD VENIPUNCTURE: CPT

## 2022-09-08 RX ORDER — CLOTRIMAZOLE AND BETAMETHASONE DIPROPIONATE 10; .5 MG/G; MG/G
1-0.05 CREAM TOPICAL TWICE DAILY
Qty: 1 | Refills: 3 | Status: ACTIVE | COMMUNITY
Start: 2019-12-05 | End: 1900-01-01

## 2022-09-08 NOTE — PHYSICAL EXAM
[No Acute Distress] : no acute distress [Well Nourished] : well nourished [Normal Sclera/Conjunctiva] : normal sclera/conjunctiva [Normal Outer Ear/Nose] : the outer ears and nose were normal in appearance [No Respiratory Distress] : no respiratory distress  [No Accessory Muscle Use] : no accessory muscle use [Clear to Auscultation] : lungs were clear to auscultation bilaterally [Normal Rate] : normal rate  [Regular Rhythm] : with a regular rhythm [Normal S1, S2] : normal S1 and S2 [Coordination Grossly Intact] : coordination grossly intact [Normal Gait] : normal gait [Normal Affect] : the affect was normal [Normal Insight/Judgement] : insight and judgment were intact [de-identified] : overweight [de-identified] : b/l LE edema

## 2022-09-08 NOTE — PLAN
[FreeTextEntry1] : Depression screening negative\par BP  is stable on diltiazem, losartan and lasix\par Check HbA1c, CMP and lipids. Continue metformin. Will adjust dose as needed. \par Needs weight loss. Bariatric surgery discussed.\par she has chronic venous stasis. She wear compression stockings.\par f/u 3months\par

## 2022-09-08 NOTE — HEALTH RISK ASSESSMENT
[Former] : Former [0] : 2) Feeling down, depressed, or hopeless: Not at all (0) [PHQ-2 Negative - No further assessment needed] : PHQ-2 Negative - No further assessment needed [[id number]] : 0

## 2022-09-08 NOTE — HISTORY OF PRESENT ILLNESS
[FreeTextEntry1] : f/u BP, blood sugar, asthma,  [de-identified] : 66-year-old female with a history of morbid obesity, asthma, hypertension and diabetes.\par Started on Metformin last visit. She has tried to limit sugary snacks and eating more yogurt and fruit. Cant give up pasta. Still has candy occasionally but not as much.\par She has been having trouble with her vision in the left eye. She has a macular hole and an epiretinal membrane. She is being observed for now. She follows up next week.\par Went dermatology for red area on her leg\par \par

## 2022-09-14 LAB
ALBUMIN SERPL ELPH-MCNC: 4.1 G/DL
ALP BLD-CCNC: 70 U/L
ALT SERPL-CCNC: 19 U/L
ANION GAP SERPL CALC-SCNC: 14 MMOL/L
AST SERPL-CCNC: 21 U/L
BILIRUB SERPL-MCNC: 0.5 MG/DL
BUN SERPL-MCNC: 12 MG/DL
CALCIUM SERPL-MCNC: 9.8 MG/DL
CHLORIDE SERPL-SCNC: 101 MMOL/L
CHOLEST SERPL-MCNC: 160 MG/DL
CO2 SERPL-SCNC: 27 MMOL/L
CREAT SERPL-MCNC: 0.75 MG/DL
EGFR: 87 ML/MIN/1.73M2
ESTIMATED AVERAGE GLUCOSE: 146 MG/DL
GLUCOSE SERPL-MCNC: 138 MG/DL
HBA1C MFR BLD HPLC: 6.7 %
HDLC SERPL-MCNC: 45 MG/DL
LDLC SERPL CALC-MCNC: 78 MG/DL
NONHDLC SERPL-MCNC: 115 MG/DL
POTASSIUM SERPL-SCNC: 4.3 MMOL/L
PROT SERPL-MCNC: 6.9 G/DL
SODIUM SERPL-SCNC: 142 MMOL/L
TRIGL SERPL-MCNC: 184 MG/DL

## 2022-11-01 RX ORDER — ALBUTEROL SULFATE 90 UG/1
108 (90 BASE) INHALANT RESPIRATORY (INHALATION)
Qty: 1 | Refills: 3 | Status: ACTIVE | COMMUNITY
Start: 2018-12-04 | End: 1900-01-01

## 2022-11-30 ENCOUNTER — NON-APPOINTMENT (OUTPATIENT)
Age: 67
End: 2022-11-30

## 2022-11-30 ENCOUNTER — APPOINTMENT (OUTPATIENT)
Dept: INTERNAL MEDICINE | Facility: CLINIC | Age: 67
End: 2022-11-30

## 2022-11-30 VITALS
TEMPERATURE: 98 F | WEIGHT: 258 LBS | SYSTOLIC BLOOD PRESSURE: 120 MMHG | HEIGHT: 58 IN | OXYGEN SATURATION: 96 % | BODY MASS INDEX: 54.16 KG/M2 | HEART RATE: 97 BPM | DIASTOLIC BLOOD PRESSURE: 66 MMHG

## 2022-11-30 DIAGNOSIS — L03.115 CELLULITIS OF RIGHT LOWER LIMB: ICD-10-CM

## 2022-11-30 DIAGNOSIS — R73.03 PREDIABETES.: ICD-10-CM

## 2022-11-30 DIAGNOSIS — Z01.818 ENCOUNTER FOR OTHER PREPROCEDURAL EXAMINATION: ICD-10-CM

## 2022-11-30 PROCEDURE — 93000 ELECTROCARDIOGRAM COMPLETE: CPT

## 2022-11-30 PROCEDURE — 99213 OFFICE O/P EST LOW 20 MIN: CPT | Mod: 25

## 2022-11-30 RX ORDER — FLUTICASONE PROPIONATE 50 UG/1
50 SPRAY, METERED NASAL TWICE DAILY
Qty: 1 | Refills: 1 | Status: DISCONTINUED | COMMUNITY
Start: 2021-03-31 | End: 2022-11-30

## 2022-11-30 RX ORDER — PREDNISOLONE ACETATE 10 MG/ML
1 SUSPENSION/ DROPS OPHTHALMIC
Qty: 15 | Refills: 0 | Status: COMPLETED | COMMUNITY
Start: 2022-09-16

## 2022-11-30 RX ORDER — KETOROLAC TROMETHAMINE 5 MG/ML
0.5 SOLUTION OPHTHALMIC
Qty: 10 | Refills: 0 | Status: COMPLETED | COMMUNITY
Start: 2022-09-16

## 2022-11-30 RX ORDER — FLUTICASONE PROPIONATE 0.05 MG/G
0.01 OINTMENT TOPICAL
Qty: 60 | Refills: 0 | Status: COMPLETED | COMMUNITY
Start: 2022-06-17

## 2022-11-30 NOTE — ASSESSMENT
[High Risk Surgery - Intraperitoneal, Intrathoracic or Supringuinal Vascular Procedures] : High Risk Surgery - Intraperitoneal, Intrathoracic or Supringuinal Vascular Procedures - No (0) [Ischemic Heart Disease] : Ischemic Heart Disease - No (0) [Congestive Heart Failure] : Congestive Heart Failure - No (0) [Prior Cerebrovascular Accident or TIA] : Prior Cerebrovascular Accident or TIA - No (0) [Creatinine >= 2mg/dL (1 Point)] : Creatinine >= 2mg/dL - No (0) [Insulin-dependent Diabetic (1 Point)] : Insulin-dependent Diabetic - No (0) [Patient Optimized for Surgery] : Patient optimized for surgery [No Further Testing Recommended] : no further testing recommended [As per surgery] : as per surgery [FreeTextEntry4] : 66 year old obese female with a history of diabetes, hypertension and asthma who is planned for macular hole repair. [FreeTextEntry7] : Hold metformin the night before the procedure and the morning of the procedure.  Thighs and on losartan with a small sip of water the morning of procedure.  Use as no medications the morning of procedure.

## 2022-11-30 NOTE — HISTORY OF PRESENT ILLNESS
[No Pertinent Cardiac History] : no history of aortic stenosis, atrial fibrillation, coronary artery disease, recent myocardial infarction, or implantable device/pacemaker [No Pertinent Pulmonary History] : no history of asthma, COPD, sleep apnea, or smoking [No Adverse Anesthesia Reaction] : no adverse anesthesia reaction in self or family member [Diabetes] : diabetes [(Patient denies any chest pain, claudication, dyspnea on exertion, orthopnea, palpitations or syncope)] : Patient denies any chest pain, claudication, dyspnea on exertion, orthopnea, palpitations or syncope [Chronic Anticoagulation] : no chronic anticoagulation [Chronic Kidney Disease] : no chronic kidney disease [FreeTextEntry1] : Macular hole repair [FreeTextEntry2] : 12/14/2022 [FreeTextEntry3] : Dr JAZMINE Maynard [FreeTextEntry4] : 66 year old obese female with a history of diabetes, hypertension and asthma who is planned for macular hole repair.

## 2022-11-30 NOTE — PHYSICAL EXAM
[No Acute Distress] : no acute distress [Normal Sclera/Conjunctiva] : normal sclera/conjunctiva [Normal Outer Ear/Nose] : the outer ears and nose were normal in appearance [No Respiratory Distress] : no respiratory distress  [No Accessory Muscle Use] : no accessory muscle use [Clear to Auscultation] : lungs were clear to auscultation bilaterally [Normal Rate] : normal rate  [Regular Rhythm] : with a regular rhythm [Normal S1, S2] : normal S1 and S2 [No Extremity Clubbing/Cyanosis] : no extremity clubbing/cyanosis [Soft] : abdomen soft [Non Tender] : non-tender [Non-distended] : non-distended [Normal Bowel Sounds] : normal bowel sounds [Coordination Grossly Intact] : coordination grossly intact [Normal Gait] : normal gait [Normal Affect] : the affect was normal [Alert and Oriented x3] : oriented to person, place, and time [Normal Insight/Judgement] : insight and judgment were intact [de-identified] : overweight [de-identified] : trace edema

## 2022-12-12 ENCOUNTER — OUTPATIENT (OUTPATIENT)
Dept: OUTPATIENT SERVICES | Facility: HOSPITAL | Age: 67
LOS: 1 days | End: 2022-12-12
Payer: MEDICARE

## 2022-12-12 DIAGNOSIS — Z96.651 PRESENCE OF RIGHT ARTIFICIAL KNEE JOINT: Chronic | ICD-10-CM

## 2022-12-12 DIAGNOSIS — Z20.828 CONTACT WITH AND (SUSPECTED) EXPOSURE TO OTHER VIRAL COMMUNICABLE DISEASES: ICD-10-CM

## 2022-12-12 PROCEDURE — U0005: CPT

## 2022-12-12 PROCEDURE — U0003: CPT

## 2022-12-13 ENCOUNTER — TRANSCRIPTION ENCOUNTER (OUTPATIENT)
Age: 67
End: 2022-12-13

## 2022-12-13 LAB — SARS-COV-2 RNA SPEC QL NAA+PROBE: SIGNIFICANT CHANGE UP

## 2022-12-14 ENCOUNTER — TRANSCRIPTION ENCOUNTER (OUTPATIENT)
Age: 67
End: 2022-12-14

## 2022-12-14 ENCOUNTER — OUTPATIENT (OUTPATIENT)
Dept: OUTPATIENT SERVICES | Facility: HOSPITAL | Age: 67
LOS: 1 days | End: 2022-12-14
Payer: MEDICARE

## 2022-12-14 VITALS
HEART RATE: 86 BPM | SYSTOLIC BLOOD PRESSURE: 136 MMHG | HEIGHT: 57 IN | OXYGEN SATURATION: 95 % | WEIGHT: 254.85 LBS | TEMPERATURE: 98 F | DIASTOLIC BLOOD PRESSURE: 62 MMHG | RESPIRATION RATE: 19 BRPM

## 2022-12-14 VITALS
HEART RATE: 76 BPM | SYSTOLIC BLOOD PRESSURE: 108 MMHG | DIASTOLIC BLOOD PRESSURE: 68 MMHG | RESPIRATION RATE: 16 BRPM | OXYGEN SATURATION: 96 %

## 2022-12-14 DIAGNOSIS — H35.342 MACULAR CYST, HOLE, OR PSEUDOHOLE, LEFT EYE: ICD-10-CM

## 2022-12-14 DIAGNOSIS — Z96.651 PRESENCE OF RIGHT ARTIFICIAL KNEE JOINT: Chronic | ICD-10-CM

## 2022-12-14 DIAGNOSIS — Z98.890 OTHER SPECIFIED POSTPROCEDURAL STATES: Chronic | ICD-10-CM

## 2022-12-14 DIAGNOSIS — Z98.891 HISTORY OF UTERINE SCAR FROM PREVIOUS SURGERY: Chronic | ICD-10-CM

## 2022-12-14 LAB — GLUCOSE BLDC GLUCOMTR-MCNC: 138 MG/DL — HIGH (ref 70–99)

## 2022-12-14 PROCEDURE — 82962 GLUCOSE BLOOD TEST: CPT

## 2022-12-14 PROCEDURE — 67042 VIT FOR MACULAR HOLE: CPT | Mod: AS

## 2022-12-14 PROCEDURE — C1889: CPT

## 2022-12-14 PROCEDURE — 67042 VIT FOR MACULAR HOLE: CPT | Mod: LT

## 2022-12-14 DEVICE — GS C3F8 PERFLUOROPROPANE IOL 2.5 L 20GM
Type: IMPLANTABLE DEVICE | Site: LEFT | Status: NON-FUNCTIONAL
Removed: 2022-12-14

## 2022-12-14 RX ORDER — DILTIAZEM HCL 120 MG
1 CAPSULE, EXT RELEASE 24 HR ORAL
Qty: 0 | Refills: 0 | DISCHARGE

## 2022-12-14 RX ORDER — SALMETEROL XINAFOATE 50 MCG
1 BLISTER, WITH INHALATION DEVICE INHALATION
Qty: 0 | Refills: 0 | DISCHARGE

## 2022-12-14 RX ORDER — FUROSEMIDE 40 MG
1 TABLET ORAL
Qty: 0 | Refills: 0 | DISCHARGE

## 2022-12-14 RX ORDER — MULTIVIT-MIN/FERROUS GLUCONATE 9 MG/15 ML
1 LIQUID (ML) ORAL
Qty: 0 | Refills: 0 | DISCHARGE

## 2022-12-14 RX ORDER — LOSARTAN POTASSIUM 100 MG/1
1 TABLET, FILM COATED ORAL
Qty: 0 | Refills: 0 | DISCHARGE

## 2022-12-14 RX ORDER — CHOLECALCIFEROL (VITAMIN D3) 125 MCG
1 CAPSULE ORAL
Qty: 0 | Refills: 0 | DISCHARGE

## 2022-12-14 RX ORDER — ALBUTEROL 90 UG/1
2 AEROSOL, METERED ORAL
Qty: 0 | Refills: 0 | DISCHARGE

## 2022-12-14 RX ORDER — ASPIRIN/CALCIUM CARB/MAGNESIUM 324 MG
1 TABLET ORAL
Qty: 0 | Refills: 0 | DISCHARGE

## 2022-12-14 RX ORDER — FLUTICASONE PROPIONATE 50 MCG
1 SPRAY, SUSPENSION NASAL
Qty: 0 | Refills: 0 | DISCHARGE

## 2022-12-14 RX ORDER — METFORMIN HYDROCHLORIDE 850 MG/1
1 TABLET ORAL
Qty: 0 | Refills: 0 | DISCHARGE

## 2022-12-14 RX ORDER — FLUTICASONE PROPIONATE 220 MCG
1 AEROSOL WITH ADAPTER (GRAM) INHALATION
Qty: 0 | Refills: 0 | DISCHARGE

## 2022-12-14 NOTE — ASU PATIENT PROFILE, ADULT - FALL HARM RISK - HARM RISK INTERVENTIONS

## 2022-12-14 NOTE — ASU PATIENT PROFILE, ADULT - NSICDXPASTSURGICALHX_GEN_ALL_CORE_FT
PAST SURGICAL HISTORY:  H/O dilation and curettage     S/P      S/P carpal tunnel release bilateral    Status post right knee replacement

## 2022-12-14 NOTE — ASU PATIENT PROFILE, ADULT - VISION (WITH CORRECTIVE LENSES IF THE PATIENT USUALLY WEARS THEM):
left eye distorted/Partially impaired: cannot see medication labels or newsprint, but can see obstacles in path, and the surrounding layout; can count fingers at arm's length

## 2022-12-14 NOTE — ASU DISCHARGE PLAN (ADULT/PEDIATRIC) - NS MD DC FALL RISK RISK
For information on Fall & Injury Prevention, visit: [email or web address] OR  [email or web address] OR  https://www.cdc.gov/steadi/patient.html

## 2022-12-14 NOTE — ASU PREOP CHECKLIST - 1.
Patient has a left ankle discolored pink Patient has a left ankle discolored pink in color, and has been evaluated by her PMD and is shirley topical antibiotics. Dr Maynard and anesthesia made aware.

## 2022-12-14 NOTE — ASU PATIENT PROFILE, ADULT - NSICDXPASTMEDICALHX_GEN_ALL_CORE_FT
PAST MEDICAL HISTORY:  Asthma     Chronic venous insufficiency     Diabetes Borderline    H/O allergic rhinitis     H/O athlete's foot     H/O dermatitis     H/O hyperthyroidism     H/O: depression     Hematuria     History of hepatitis C     Hypertension     Intertriginous candidiasis     Lower extremity cellulitis bilateral    Morbid obesity     Ulnar neuropathy of left upper extremity

## 2022-12-14 NOTE — ASU DISCHARGE PLAN (ADULT/PEDIATRIC) - CARE PROVIDER_API CALL
Dung Maynard)  Ophthalmology  [address], [address]  [place] [address]  Phone: [phone]  Fax: [phone]  Scheduled Appointment: 12/15/2022 11:00 AM

## 2022-12-14 NOTE — ASU DISCHARGE PLAN (ADULT/PEDIATRIC) - HAVE YOU HAD COVID IN THE LAST 60 DAYS?
Patient Class: Inpatient [101]   REQUIRED: Diagnosis: TIA (transient ischemic attack) [115957]   Estimated Length of Stay: Estimated stay of more than 2 midnights   Admitting Provider: Robles Kaur [[id number]] No

## 2022-12-14 NOTE — ASU PATIENT PROFILE, ADULT - ABILITY TO HEAR (WITH HEARING AID OR HEARING APPLIANCE IF NORMALLY USED):
Poor has no hearing aids/Mildly to Moderately Impaired: difficulty hearing in some environments or speaker may need to increase volume or speak distinctly

## 2023-01-24 ENCOUNTER — APPOINTMENT (OUTPATIENT)
Dept: INTERNAL MEDICINE | Facility: CLINIC | Age: 68
End: 2023-01-24
Payer: MEDICARE

## 2023-01-24 ENCOUNTER — RX CHANGE (OUTPATIENT)
Age: 68
End: 2023-01-24

## 2023-01-24 VITALS
DIASTOLIC BLOOD PRESSURE: 68 MMHG | HEART RATE: 101 BPM | OXYGEN SATURATION: 94 % | WEIGHT: 253 LBS | SYSTOLIC BLOOD PRESSURE: 110 MMHG | BODY MASS INDEX: 54.58 KG/M2 | TEMPERATURE: 98.1 F | HEIGHT: 57 IN

## 2023-01-24 PROBLEM — G56.22 LESION OF ULNAR NERVE, LEFT UPPER LIMB: Chronic | Status: ACTIVE | Noted: 2022-12-14

## 2023-01-24 PROBLEM — Z86.19 PERSONAL HISTORY OF OTHER INFECTIOUS AND PARASITIC DISEASES: Chronic | Status: ACTIVE | Noted: 2022-12-14

## 2023-01-24 PROBLEM — E11.9 TYPE 2 DIABETES MELLITUS WITHOUT COMPLICATIONS: Chronic | Status: ACTIVE | Noted: 2022-12-14

## 2023-01-24 PROBLEM — Z87.2 PERSONAL HISTORY OF DISEASES OF THE SKIN AND SUBCUTANEOUS TISSUE: Chronic | Status: ACTIVE | Noted: 2022-12-14

## 2023-01-24 PROBLEM — Z87.09 PERSONAL HISTORY OF OTHER DISEASES OF THE RESPIRATORY SYSTEM: Chronic | Status: ACTIVE | Noted: 2022-12-14

## 2023-01-24 PROBLEM — Z86.59 PERSONAL HISTORY OF OTHER MENTAL AND BEHAVIORAL DISORDERS: Chronic | Status: ACTIVE | Noted: 2022-12-14

## 2023-01-24 PROBLEM — L03.119 CELLULITIS OF UNSPECIFIED PART OF LIMB: Chronic | Status: ACTIVE | Noted: 2022-12-14

## 2023-01-24 PROBLEM — I87.2 VENOUS INSUFFICIENCY (CHRONIC) (PERIPHERAL): Chronic | Status: ACTIVE | Noted: 2022-12-14

## 2023-01-24 PROBLEM — B37.2 CANDIDIASIS OF SKIN AND NAIL: Chronic | Status: ACTIVE | Noted: 2022-12-14

## 2023-01-24 PROBLEM — R31.9 HEMATURIA, UNSPECIFIED: Chronic | Status: ACTIVE | Noted: 2022-12-14

## 2023-01-24 PROBLEM — E66.01 MORBID (SEVERE) OBESITY DUE TO EXCESS CALORIES: Chronic | Status: ACTIVE | Noted: 2022-12-14

## 2023-01-24 PROBLEM — Z86.39 PERSONAL HISTORY OF OTHER ENDOCRINE, NUTRITIONAL AND METABOLIC DISEASE: Chronic | Status: ACTIVE | Noted: 2022-12-14

## 2023-01-24 PROCEDURE — 99214 OFFICE O/P EST MOD 30 MIN: CPT | Mod: 25

## 2023-01-24 PROCEDURE — 36415 COLL VENOUS BLD VENIPUNCTURE: CPT

## 2023-01-24 RX ORDER — FLUTICASONE PROPIONATE 110 UG/1
110 AEROSOL, METERED RESPIRATORY (INHALATION)
Qty: 1 | Refills: 1 | Status: DISCONTINUED | COMMUNITY
Start: 2022-07-29 | End: 2023-01-24

## 2023-01-24 RX ORDER — FLUTICASONE PROPIONATE 110 UG/1
110 AEROSOL, METERED RESPIRATORY (INHALATION)
Qty: 3 | Refills: 1 | Status: ACTIVE | COMMUNITY
Start: 2021-03-31 | End: 1900-01-01

## 2023-01-24 NOTE — PHYSICAL EXAM
[No Acute Distress] : no acute distress [Normal Sclera/Conjunctiva] : normal sclera/conjunctiva [Normal Outer Ear/Nose] : the outer ears and nose were normal in appearance [No Respiratory Distress] : no respiratory distress  [No Accessory Muscle Use] : no accessory muscle use [Clear to Auscultation] : lungs were clear to auscultation bilaterally [Normal Rate] : normal rate  [Regular Rhythm] : with a regular rhythm [Normal S1, S2] : normal S1 and S2 [Coordination Grossly Intact] : coordination grossly intact [Normal Gait] : normal gait [Normal Affect] : the affect was normal [Normal Insight/Judgement] : insight and judgment were intact [de-identified] : overweight [de-identified] : b/l LE edema

## 2023-01-24 NOTE — HISTORY OF PRESENT ILLNESS
[FreeTextEntry1] : f/u diabetes, BP, asthma,  [de-identified] : 67-year-old female with a history of morbid obesity, asthma, hypertension and diabetes.\par Last visit metformin was increased to twice daily.  She has been tried to limit her sugar intake.  Eating less carbohydrates.  She has lost a little weight.  No polyuria or  polydipsia.  No chest pain or palpitations.\par She had a recent repair of  a left eye macular hole and an epiretinal membrane.  Her vision is still a little blurry.\par \par

## 2023-01-24 NOTE — HEALTH RISK ASSESSMENT
[Former] : Former [0] : 2) Feeling down, depressed, or hopeless: Not at all (0) [PHQ-2 Negative - No further assessment needed] : PHQ-2 Negative - No further assessment needed [No falls in past year] : Patient reported no falls in the past year [[id number]] : 0

## 2023-01-24 NOTE — REVIEW OF SYSTEMS
[Negative] : Neurological [Recent Change In Weight] : ~T recent weight change [Fever] : no fever [Chills] : no chills [FreeTextEntry2] : as noted in HPI  [FreeTextEntry3] : as noted in HPI

## 2023-01-24 NOTE — PLAN
[FreeTextEntry1] : Depression screening negative\par BP  is stable on diltiazem, losartan and lasix\par Check HbA1c, CMP and lipids. Continue metformin.  Ozempic discussed with patient.  Risks and benefits discussed.  will start 0.25 mg x 4 weeks and then increase to 0.5 mg. \par Refill Flovent.  She needs the brand rather than the generic for insurance purposes.\par f/u 3months\par

## 2023-01-25 ENCOUNTER — RX CHANGE (OUTPATIENT)
Age: 68
End: 2023-01-25

## 2023-01-28 LAB
ALBUMIN SERPL ELPH-MCNC: 4.3 G/DL
ALP BLD-CCNC: 66 U/L
ALT SERPL-CCNC: 21 U/L
ANION GAP SERPL CALC-SCNC: 14 MMOL/L
AST SERPL-CCNC: 18 U/L
BILIRUB SERPL-MCNC: 0.5 MG/DL
BUN SERPL-MCNC: 18 MG/DL
CALCIUM SERPL-MCNC: 9.5 MG/DL
CHLORIDE SERPL-SCNC: 100 MMOL/L
CHOLEST SERPL-MCNC: 177 MG/DL
CO2 SERPL-SCNC: 26 MMOL/L
CREAT SERPL-MCNC: 0.78 MG/DL
EGFR: 83 ML/MIN/1.73M2
ESTIMATED AVERAGE GLUCOSE: 131 MG/DL
GLUCOSE SERPL-MCNC: 129 MG/DL
HBA1C MFR BLD HPLC: 6.2 %
HDLC SERPL-MCNC: 48 MG/DL
LDLC SERPL CALC-MCNC: 94 MG/DL
NONHDLC SERPL-MCNC: 129 MG/DL
POTASSIUM SERPL-SCNC: 4.2 MMOL/L
PROT SERPL-MCNC: 7 G/DL
SODIUM SERPL-SCNC: 140 MMOL/L
TRIGL SERPL-MCNC: 177 MG/DL

## 2023-01-30 ENCOUNTER — NON-APPOINTMENT (OUTPATIENT)
Age: 68
End: 2023-01-30

## 2023-03-06 ENCOUNTER — NON-APPOINTMENT (OUTPATIENT)
Age: 68
End: 2023-03-06

## 2023-04-11 ENCOUNTER — RX RENEWAL (OUTPATIENT)
Age: 68
End: 2023-04-11

## 2023-04-30 NOTE — HISTORY OF PRESENT ILLNESS
[FreeTextEntry1] : f/u diabetes, BP, asthma,  [de-identified] : 67-year-old female with a history of morbid obesity, asthma, hypertension and diabetes.\par Last visit metformin was increased to twice daily.  She has been tried to limit her sugar intake.  Eating less carbohydrates.  She has lost a little weight.  No polyuria or  polydipsia.  No chest pain or palpitations.\par She had a recent repair of  a left eye macular hole and an epiretinal membrane.  Her vision is still a little blurry.\par \par

## 2023-05-02 ENCOUNTER — APPOINTMENT (OUTPATIENT)
Dept: INTERNAL MEDICINE | Facility: CLINIC | Age: 68
End: 2023-05-02
Payer: MEDICARE

## 2023-05-09 NOTE — ED PROVIDER NOTE - WET READ LAUNCH FT
There are no Wet Read(s) to document. There is 1 Wet Read(s) to document. Tazorac Pregnancy And Lactation Text: This medication is not safe during pregnancy. It is unknown if this medication is excreted in breast milk.

## 2023-05-23 ENCOUNTER — APPOINTMENT (OUTPATIENT)
Dept: INTERNAL MEDICINE | Facility: CLINIC | Age: 68
End: 2023-05-23
Payer: MEDICARE

## 2023-05-23 VITALS
HEART RATE: 92 BPM | WEIGHT: 250 LBS | DIASTOLIC BLOOD PRESSURE: 52 MMHG | BODY MASS INDEX: 53.94 KG/M2 | TEMPERATURE: 98.2 F | SYSTOLIC BLOOD PRESSURE: 114 MMHG | OXYGEN SATURATION: 94 % | HEIGHT: 57 IN

## 2023-05-23 DIAGNOSIS — I10 ESSENTIAL (PRIMARY) HYPERTENSION: ICD-10-CM

## 2023-05-23 DIAGNOSIS — M81.0 AGE-RELATED OSTEOPOROSIS W/OUT CURRENT PATHOLOGICAL FRACTURE: ICD-10-CM

## 2023-05-23 DIAGNOSIS — E11.9 TYPE 2 DIABETES MELLITUS W/OUT COMPLICATIONS: ICD-10-CM

## 2023-05-23 DIAGNOSIS — E66.01 MORBID (SEVERE) OBESITY DUE TO EXCESS CALORIES: Chronic | ICD-10-CM

## 2023-05-23 PROCEDURE — 36415 COLL VENOUS BLD VENIPUNCTURE: CPT

## 2023-05-23 PROCEDURE — 99214 OFFICE O/P EST MOD 30 MIN: CPT | Mod: 25

## 2023-05-23 RX ORDER — FLUTICASONE PROPIONATE 50 UG/1
50 SPRAY, METERED NASAL TWICE DAILY
Qty: 1 | Refills: 2 | Status: ACTIVE | COMMUNITY
Start: 2023-05-23 | End: 1900-01-01

## 2023-05-23 RX ORDER — SEMAGLUTIDE 1.34 MG/ML
2 INJECTION, SOLUTION SUBCUTANEOUS
Qty: 3 | Refills: 1 | Status: DISCONTINUED | COMMUNITY
Start: 2023-01-24 | End: 2023-05-23

## 2023-05-23 NOTE — HISTORY OF PRESENT ILLNESS
[FreeTextEntry1] : f/u diabetes, BP, asthma,  [de-identified] : 67-year-old female with a history of morbid obesity, asthma, hypertension and diabetes.\par She tried Ozempic. She had SOB and palpitations. she stopped  the Ozempic.  She saw her cardiologist who recommended Mounjaro but she does not want to take an injection.  She is taking metformin twice daily.\par   She has been tried to limit her sugar intake.  Eating less carbohydrates.  She has lost a little weight.  No polyuria or  polydipsia.  No chest pain or palpitations.\par \par

## 2023-05-23 NOTE — HEALTH RISK ASSESSMENT
Problem: Falls - Risk of  Goal: *Absence of Falls  Description: Document Tatyana Jonesmanasa Fall Risk and appropriate interventions in the flowsheet.   Outcome: Progressing Towards Goal  Note: Fall Risk Interventions:  Mobility Interventions: Communicate number of staff needed for ambulation/transfer, Patient to call before getting OOB, Utilize walker, cane, or other assistive device         Medication Interventions: Assess postural VS orthostatic hypotension, Patient to call before getting OOB, Teach patient to arise slowly    Elimination Interventions: Call light in reach, Patient to call for help with toileting needs    History of Falls Interventions: Bed/chair exit alarm [No falls in past year] : Patient reported no falls in the past year [0] : 2) Feeling down, depressed, or hopeless: Not at all (0) [PHQ-2 Negative - No further assessment needed] : PHQ-2 Negative - No further assessment needed [[id number]] : 0 [Former] : Former

## 2023-05-23 NOTE — PHYSICAL EXAM
[No Acute Distress] : no acute distress [Normal Sclera/Conjunctiva] : normal sclera/conjunctiva [Normal Outer Ear/Nose] : the outer ears and nose were normal in appearance [No Respiratory Distress] : no respiratory distress  [No Accessory Muscle Use] : no accessory muscle use [Clear to Auscultation] : lungs were clear to auscultation bilaterally [Normal Rate] : normal rate  [Regular Rhythm] : with a regular rhythm [Normal S1, S2] : normal S1 and S2 [Coordination Grossly Intact] : coordination grossly intact [Normal Gait] : normal gait [Normal Affect] : the affect was normal [Normal Insight/Judgement] : insight and judgment were intact [de-identified] : overweight [de-identified] : b/l LE edema

## 2023-05-23 NOTE — PLAN
[FreeTextEntry1] : Depression screening negative\par BP  is stable on diltiazem, losartan and lasix\par Check HbA1c, CMP and lipids. Continue metformin.  Discussed the other GLP-1 alternatives.  She is not amenable to taking an injectable medication.  We did discuss Rybelsus but she declined at this time\par will send her to healthy weight [facility] center\par Prescription for mammogram and bone density given today\par f/u 3months\par

## 2023-05-23 NOTE — REVIEW OF SYSTEMS
[Recent Change In Weight] : ~T recent weight change [Negative] : Neurological [Fever] : no fever [Chills] : no chills [FreeTextEntry2] : as noted in HPI  [FreeTextEntry3] : as noted in HPI

## 2023-05-29 LAB
ALBUMIN SERPL ELPH-MCNC: 4.3 G/DL
ALP BLD-CCNC: 63 U/L
ALT SERPL-CCNC: 19 U/L
ANION GAP SERPL CALC-SCNC: 15 MMOL/L
AST SERPL-CCNC: 18 U/L
BILIRUB SERPL-MCNC: 0.4 MG/DL
BUN SERPL-MCNC: 19 MG/DL
CALCIUM SERPL-MCNC: 9.9 MG/DL
CHLORIDE SERPL-SCNC: 100 MMOL/L
CHOLEST SERPL-MCNC: 169 MG/DL
CO2 SERPL-SCNC: 25 MMOL/L
CREAT SERPL-MCNC: 0.78 MG/DL
EGFR: 83 ML/MIN/1.73M2
ESTIMATED AVERAGE GLUCOSE: 126 MG/DL
GLUCOSE SERPL-MCNC: 127 MG/DL
HBA1C MFR BLD HPLC: 6 %
HDLC SERPL-MCNC: 53 MG/DL
LDLC SERPL CALC-MCNC: 84 MG/DL
NONHDLC SERPL-MCNC: 116 MG/DL
POTASSIUM SERPL-SCNC: 4.2 MMOL/L
PROT SERPL-MCNC: 6.9 G/DL
SODIUM SERPL-SCNC: 140 MMOL/L
TRIGL SERPL-MCNC: 161 MG/DL

## 2023-05-30 ENCOUNTER — NON-APPOINTMENT (OUTPATIENT)
Age: 68
End: 2023-05-30

## 2023-06-21 ENCOUNTER — RX CHANGE (OUTPATIENT)
Age: 68
End: 2023-06-21

## 2023-07-19 RX ORDER — METFORMIN ER 500 MG 500 MG/1
500 TABLET ORAL
Qty: 180 | Refills: 1 | Status: ACTIVE | COMMUNITY
Start: 2022-06-01 | End: 1900-01-01

## 2023-09-13 ENCOUNTER — APPOINTMENT (OUTPATIENT)
Dept: INTERNAL MEDICINE | Facility: CLINIC | Age: 68
End: 2023-09-13

## 2023-09-26 ENCOUNTER — RX RENEWAL (OUTPATIENT)
Age: 68
End: 2023-09-26

## 2023-09-26 RX ORDER — ECONAZOLE NITRATE 10 MG/G
1 CREAM TOPICAL TWICE DAILY
Qty: 30 | Refills: 0 | Status: ACTIVE | COMMUNITY
Start: 2021-07-29 | End: 1900-01-01

## 2023-10-18 ENCOUNTER — RX RENEWAL (OUTPATIENT)
Age: 68
End: 2023-10-18

## 2023-10-18 RX ORDER — SALMETEROL XINAFOATE 50 UG/1
50 POWDER, METERED ORAL; RESPIRATORY (INHALATION)
Qty: 1 | Refills: 0 | Status: ACTIVE | COMMUNITY
Start: 2018-09-25 | End: 1900-01-01

## (undated) DEVICE — GLV 7 PROTEXIS (WHITE)

## (undated) DEVICE — LIGHT SHIELD CORNEAL

## (undated) DEVICE — PACK VITRECTOMY

## (undated) DEVICE — NDL HYPO SAFE 22G X 1.5" (BLACK)

## (undated) DEVICE — SOL IRR BAL SALT + 500ML

## (undated) DEVICE — SUT VICRYL 7-0 12" TG140-8 DA

## (undated) DEVICE — DRSG MASTISOL

## (undated) DEVICE — CONSTELLATION TOTAL PLUS PAK 23G

## (undated) DEVICE — CANNULA MEDONE FLEXTIP 25G

## (undated) DEVICE — DRAPE STERI-DRAPE INCISE 13X13"

## (undated) DEVICE — AUTO GAS FILL CONSTELLATION

## (undated) DEVICE — WARMING BLANKET LOWER ADULT

## (undated) DEVICE — ILM FORCEP 25G

## (undated) DEVICE — VENODYNE/SCD SLEEVE CALF MEDIUM